# Patient Record
Sex: FEMALE | URBAN - NONMETROPOLITAN AREA
[De-identification: names, ages, dates, MRNs, and addresses within clinical notes are randomized per-mention and may not be internally consistent; named-entity substitution may affect disease eponyms.]

---

## 2023-10-26 ENCOUNTER — NURSE ONLY (OUTPATIENT)
Age: 68
End: 2023-10-26

## 2023-10-26 LAB
ALBUMIN SERPL BCG-MCNC: 4.1 G/DL (ref 3.5–5.1)
ALP SERPL-CCNC: 83 U/L (ref 38–126)
ALT SERPL W/O P-5'-P-CCNC: 25 U/L (ref 11–66)
ANION GAP SERPL CALC-SCNC: 13 MEQ/L (ref 8–16)
AST SERPL-CCNC: 22 U/L (ref 5–40)
BASOPHILS ABSOLUTE: 0.1 THOU/MM3 (ref 0–0.1)
BASOPHILS NFR BLD AUTO: 1.3 %
BILIRUB SERPL-MCNC: 0.8 MG/DL (ref 0.3–1.2)
BUN SERPL-MCNC: 18 MG/DL (ref 7–22)
CALCIUM SERPL-MCNC: 9.2 MG/DL (ref 8.5–10.5)
CHLORIDE SERPL-SCNC: 103 MEQ/L (ref 98–111)
CHOLEST SERPL-MCNC: 137 MG/DL (ref 100–199)
CO2 SERPL-SCNC: 25 MEQ/L (ref 23–33)
CREAT SERPL-MCNC: 0.7 MG/DL (ref 0.4–1.2)
CREAT UR-MCNC: 157.6 MG/DL
DEPRECATED MEAN GLUCOSE BLD GHB EST-ACNC: 135 MG/DL (ref 70–126)
DEPRECATED RDW RBC AUTO: 41.3 FL (ref 35–45)
EOSINOPHIL NFR BLD AUTO: 4.3 %
EOSINOPHILS ABSOLUTE: 0.3 THOU/MM3 (ref 0–0.4)
ERYTHROCYTE [DISTWIDTH] IN BLOOD BY AUTOMATED COUNT: 13.2 % (ref 11.5–14.5)
GFR SERPL CREATININE-BSD FRML MDRD: > 60 ML/MIN/1.73M2
GLUCOSE SERPL-MCNC: 130 MG/DL (ref 70–108)
HBA1C MFR BLD HPLC: 6.5 % (ref 4.4–6.4)
HCT VFR BLD AUTO: 40.9 % (ref 37–47)
HDLC SERPL-MCNC: 41 MG/DL
HGB BLD-MCNC: 13.1 GM/DL (ref 12–16)
IMM GRANULOCYTES # BLD AUTO: 0.02 THOU/MM3 (ref 0–0.07)
IMM GRANULOCYTES NFR BLD AUTO: 0.3 %
LDLC SERPL CALC-MCNC: 67 MG/DL
LYMPHOCYTES ABSOLUTE: 2.4 THOU/MM3 (ref 1–4.8)
LYMPHOCYTES NFR BLD AUTO: 30.5 %
MCH RBC QN AUTO: 27.5 PG (ref 26–33)
MCHC RBC AUTO-ENTMCNC: 32 GM/DL (ref 32.2–35.5)
MCV RBC AUTO: 85.7 FL (ref 81–99)
MICROALBUMIN UR-MCNC: 2.37 MG/DL
MICROALBUMIN/CREAT RATIO PNL UR: 15 MG/G (ref 0–30)
MONOCYTES ABSOLUTE: 0.5 THOU/MM3 (ref 0.4–1.3)
MONOCYTES NFR BLD AUTO: 6.3 %
NEUTROPHILS NFR BLD AUTO: 57.3 %
NRBC BLD AUTO-RTO: 0 /100 WBC
PLATELET # BLD AUTO: 284 THOU/MM3 (ref 130–400)
PMV BLD AUTO: 9.8 FL (ref 9.4–12.4)
POTASSIUM SERPL-SCNC: 4 MEQ/L (ref 3.5–5.2)
PROT SERPL-MCNC: 7.3 G/DL (ref 6.1–8)
RBC # BLD AUTO: 4.77 MILL/MM3 (ref 4.2–5.4)
SEGMENTED NEUTROPHILS ABSOLUTE COUNT: 4.5 THOU/MM3 (ref 1.8–7.7)
SODIUM SERPL-SCNC: 141 MEQ/L (ref 135–145)
TRIGL SERPL-MCNC: 145 MG/DL (ref 0–199)
TSH SERPL DL<=0.005 MIU/L-ACNC: 4.53 UIU/ML (ref 0.4–4.2)
WBC # BLD AUTO: 7.8 THOU/MM3 (ref 4.8–10.8)

## 2024-01-02 ENCOUNTER — HOSPITAL ENCOUNTER (OUTPATIENT)
Dept: WOMENS IMAGING | Age: 69
Discharge: HOME | End: 2024-01-02
Attending: RADIOLOGY

## 2024-01-02 DIAGNOSIS — Z00.6 ENCOUNTER FOR EXAMINATION FOR NORMAL COMPARISON OR CONTROL IN CLINICAL RESEARCH PROGRAM: ICD-10-CM

## 2024-01-02 PROBLEM — N84.1 ENDOCERVICAL POLYP: Status: ACTIVE | Noted: 2022-05-24

## 2024-01-02 PROBLEM — N95.0 POSTMENOPAUSAL BLEEDING: Status: ACTIVE | Noted: 2022-05-24

## 2024-01-02 PROBLEM — E66.9 OBESITY (BMI 30.0-34.9): Status: ACTIVE | Noted: 2022-05-17

## 2024-01-02 PROBLEM — E66.811 OBESITY (BMI 30.0-34.9): Status: ACTIVE | Noted: 2022-05-17

## 2024-01-02 RX ORDER — FOLIC ACID/MULTIVIT,IRON,MINER 0.4MG-18MG
1 TABLET ORAL DAILY
COMMUNITY

## 2024-01-02 RX ORDER — GLUCOSAMINE SULFATE 500 MG
2000 CAPSULE ORAL DAILY
COMMUNITY

## 2024-01-02 RX ORDER — MULTIVITAMIN
1 CAPSULE ORAL DAILY
COMMUNITY

## 2024-01-02 RX ORDER — ATORVASTATIN CALCIUM 10 MG/1
10 TABLET, FILM COATED ORAL DAILY
COMMUNITY

## 2024-01-02 RX ORDER — ENALAPRIL MALEATE 10 MG/1
10 TABLET ORAL DAILY
COMMUNITY

## 2024-01-02 RX ORDER — CHLORTHALIDONE 25 MG/1
25 TABLET ORAL DAILY
COMMUNITY

## 2024-01-02 RX ORDER — CETIRIZINE HYDROCHLORIDE 10 MG/1
10 TABLET ORAL DAILY
COMMUNITY

## 2024-01-04 ENCOUNTER — HOSPITAL ENCOUNTER (OUTPATIENT)
Age: 69
Discharge: HOME | End: 2024-01-04
Payer: MEDICARE

## 2024-01-04 ENCOUNTER — TELEPHONE (OUTPATIENT)
Dept: CASE MANAGEMENT | Age: 69
End: 2024-01-04

## 2024-01-04 ENCOUNTER — OFFICE VISIT (OUTPATIENT)
Dept: SURGERY | Age: 69
End: 2024-01-04
Payer: MEDICARE

## 2024-01-04 VITALS
HEART RATE: 77 BPM | TEMPERATURE: 97.2 F | HEIGHT: 70 IN | RESPIRATION RATE: 18 BRPM | DIASTOLIC BLOOD PRESSURE: 78 MMHG | SYSTOLIC BLOOD PRESSURE: 132 MMHG | OXYGEN SATURATION: 96 % | BODY MASS INDEX: 34.07 KG/M2 | WEIGHT: 238 LBS

## 2024-01-04 DIAGNOSIS — Z01.818 PRE-OP TESTING: ICD-10-CM

## 2024-01-04 DIAGNOSIS — C50.412 CARCINOMA OF UPPER-OUTER QUADRANT OF LEFT BREAST IN FEMALE, ESTROGEN RECEPTOR POSITIVE (HCC): ICD-10-CM

## 2024-01-04 DIAGNOSIS — C50.412 CARCINOMA OF UPPER-OUTER QUADRANT OF LEFT BREAST IN FEMALE, ESTROGEN RECEPTOR POSITIVE (HCC): Primary | ICD-10-CM

## 2024-01-04 DIAGNOSIS — E66.9 OBESITY (BMI 30.0-34.9): ICD-10-CM

## 2024-01-04 DIAGNOSIS — Z17.0 CARCINOMA OF UPPER-OUTER QUADRANT OF LEFT BREAST IN FEMALE, ESTROGEN RECEPTOR POSITIVE (HCC): ICD-10-CM

## 2024-01-04 DIAGNOSIS — Z17.0 CARCINOMA OF UPPER-OUTER QUADRANT OF LEFT BREAST IN FEMALE, ESTROGEN RECEPTOR POSITIVE (HCC): Primary | ICD-10-CM

## 2024-01-04 DIAGNOSIS — D05.12 DUCTAL CARCINOMA IN SITU (DCIS) OF LEFT BREAST: ICD-10-CM

## 2024-01-04 PROCEDURE — G8400 PT W/DXA NO RESULTS DOC: HCPCS | Performed by: SURGERY

## 2024-01-04 PROCEDURE — 99204 OFFICE O/P NEW MOD 45 MIN: CPT | Performed by: SURGERY

## 2024-01-04 PROCEDURE — 1090F PRES/ABSN URINE INCON ASSESS: CPT | Performed by: SURGERY

## 2024-01-04 PROCEDURE — 1123F ACP DISCUSS/DSCN MKR DOCD: CPT | Performed by: SURGERY

## 2024-01-04 PROCEDURE — 3017F COLORECTAL CA SCREEN DOC REV: CPT | Performed by: SURGERY

## 2024-01-04 PROCEDURE — G8427 DOCREV CUR MEDS BY ELIG CLIN: HCPCS | Performed by: SURGERY

## 2024-01-04 PROCEDURE — G8417 CALC BMI ABV UP PARAM F/U: HCPCS | Performed by: SURGERY

## 2024-01-04 PROCEDURE — 1036F TOBACCO NON-USER: CPT | Performed by: SURGERY

## 2024-01-04 PROCEDURE — G8484 FLU IMMUNIZE NO ADMIN: HCPCS | Performed by: SURGERY

## 2024-01-04 PROCEDURE — 93005 ELECTROCARDIOGRAM TRACING: CPT

## 2024-01-04 PROCEDURE — G9899 SCRN MAM PERF RSLTS DOC: HCPCS | Performed by: SURGERY

## 2024-01-04 NOTE — TELEPHONE ENCOUNTER
Name: Lizett Avery  : 1955  MRN: [id number]    Oncology Navigation Follow-Up Note    Contact Type:  Telephone    Notes: Notified from surgeon's office patient interested in genetic testing . Already scheduled for lumpectomy 1/10 with Dr. Chaves. Called to assess if meets criteria. States \"only cancer in family-mother with breast cancer, age 80.\" Does not meet criteria at this time. Verbalizes understanding. Encouraged her to call if finds more family history. Offered to try to get testing through insurance but states just hold off for now. Update Winnie Ya at Dr. Chaves's office.     Electronically signed by Ana Rosa Johnson RN on 2024 at 3:04 PM

## 2024-01-04 NOTE — PROGRESS NOTES
Subjective:      Patient ID: Lizett Avery is a 68 y.o. female.    HPI    Review of Systems  Chief Complaint   Patient presents with    Surgical Consult     NP refer Dr. Servin-DCIS left breast        Objective:   Physical Exam  /78 (Site: Right Lower Arm, Position: Sitting, Cuff Size: Medium Adult)   Pulse 77   Temp 97.2 °F (36.2 °C) (Temporal)   Resp 18   Ht 1.778 m (5' 10\")   Wt 108 kg (238 lb)   SpO2 96%   BMI 34.15 kg/m²     Assessment:            Plan:              Boby Garnett RN  
Number of children: Not on file    Years of education: Not on file    Highest education level: Not on file   Occupational History    Not on file   Tobacco Use    Smoking status: Former     Current packs/day: 1.00     Types: Cigarettes    Smokeless tobacco: Never    Tobacco comments:     Stopped in 1997   Substance and Sexual Activity    Alcohol use: Not Currently    Drug use: Never    Sexual activity: Not on file   Other Topics Concern    Not on file   Social History Narrative    Not on file     Social Determinants of Health     Financial Resource Strain: Not on file   Food Insecurity: Not on file   Transportation Needs: Not on file   Physical Activity: Not on file   Stress: Not on file   Social Connections: Not on file   Intimate Partner Violence: Not on file   Housing Stability: Not on file           Review of Systems  Review of Systems   Constitutional:  Negative for chills, fatigue, fever and unexpected weight change.   HENT:  Negative for sore throat, trouble swallowing and voice change.    Eyes:  Negative for visual disturbance.   Respiratory:  Negative for cough, shortness of breath and wheezing.    Cardiovascular:  Negative for chest pain and palpitations.   Gastrointestinal:  Negative for abdominal pain, blood in stool, nausea and vomiting.   Endocrine: Negative for cold intolerance, heat intolerance and polydipsia.   Genitourinary:  Negative for dysuria, flank pain, hematuria and vaginal bleeding.   Musculoskeletal:  Negative for gait problem, joint swelling and myalgias.   Skin:  Negative for color change and rash.   Allergic/Immunologic: Negative for immunocompromised state.   Neurological:  Negative for dizziness, tremors, seizures and speech difficulty.   Hematological:  Negative for adenopathy. Does not bruise/bleed easily.   Psychiatric/Behavioral:  Negative for behavioral problems and confusion.        OBJECTIVE     /78 (Site: Right Lower Arm, Position: Sitting, Cuff Size: Medium Adult)

## 2024-01-05 LAB
EKG ATRIAL RATE: 72 BPM
EKG P AXIS: 19 DEGREES
EKG P-R INTERVAL: 150 MS
EKG Q-T INTERVAL: 402 MS
EKG QRS DURATION: 84 MS
EKG QTC CALCULATION (BAZETT): 440 MS
EKG R AXIS: 43 DEGREES
EKG T AXIS: 34 DEGREES
EKG VENTRICULAR RATE: 72 BPM

## 2024-01-08 ENCOUNTER — HOSPITAL ENCOUNTER (OUTPATIENT)
Dept: WOMENS IMAGING | Age: 69
Discharge: HOME | End: 2024-01-08
Attending: RADIOLOGY

## 2024-01-08 DIAGNOSIS — Z00.6 ENCOUNTER FOR EXAMINATION FOR NORMAL COMPARISON OR CONTROL IN CLINICAL RESEARCH PROGRAM: ICD-10-CM

## 2024-01-08 ASSESSMENT — ENCOUNTER SYMPTOMS
VOMITING: 0
BLOOD IN STOOL: 0
COUGH: 0
SHORTNESS OF BREATH: 0
COLOR CHANGE: 0
TROUBLE SWALLOWING: 0
WHEEZING: 0
ABDOMINAL PAIN: 0
NAUSEA: 0
SORE THROAT: 0
VOICE CHANGE: 0

## 2024-01-09 ENCOUNTER — HOSPITAL ENCOUNTER (OUTPATIENT)
Dept: PHYSICAL THERAPY | Age: 69
Setting detail: THERAPIES SERIES
Discharge: HOME | End: 2024-01-09
Payer: MEDICARE

## 2024-01-09 PROCEDURE — 97161 PT EVAL LOW COMPLEX 20 MIN: CPT

## 2024-01-09 PROCEDURE — 97110 THERAPEUTIC EXERCISES: CPT

## 2024-01-09 NOTE — PROGRESS NOTES
* PLEASE SIGN, DATE AND TIME CERTIFICATION BELOW AND RETURN TO [facility] OUTPATIENT REHABILITATION (FAX #: [phone]).  ATTEST/CO-SIGN IF ACCESSING VIA [employer].  THANK YOU.**    I certify that I have examined the patient below and determined that Physical Medicine and Rehabilitation service is necessary and that I approve the established plan of care for up to 90 days or as specifically noted.  Attestation, signature or co-signature of physician indicates approval of certification requirements.    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  AQUATIC THERAPY COVERED:   Yes  MODALITIES COVERED:  Yes, with the exception of iontophoresis and hot packs/cold packs  TELEHEALTH COVERED: Yes   Visit # 1, 1/10 for progress note   Visits Allowed: Unlimited visits based on medical necessity   Recertification Date: 24   Physician Follow-Up: L lumpectomy 1/10/24   Physician Orders: Prehab   History of Present Illness: Recently diagnosed with left breast cancer

## 2024-01-10 ENCOUNTER — HOSPITAL ENCOUNTER (OUTPATIENT)
Age: 69
Setting detail: OUTPATIENT SURGERY
Discharge: HOME | End: 2024-01-10
Attending: SURGERY | Admitting: SURGERY
Payer: MEDICARE

## 2024-01-10 ENCOUNTER — HOSPITAL ENCOUNTER (OUTPATIENT)
Dept: WOMENS IMAGING | Age: 69
Discharge: HOME | End: 2024-01-10
Attending: SURGERY
Payer: MEDICARE

## 2024-01-10 ENCOUNTER — HOSPITAL ENCOUNTER (OUTPATIENT)
Dept: WOMENS IMAGING | Age: 69
Discharge: HOME | End: 2024-01-10
Payer: MEDICARE

## 2024-01-10 ENCOUNTER — ANESTHESIA (OUTPATIENT)
Dept: OPERATING ROOM | Age: 69
End: 2024-01-10
Payer: MEDICARE

## 2024-01-10 ENCOUNTER — SOCIAL WORK (OUTPATIENT)
Dept: INFUSION THERAPY | Age: 69
End: 2024-01-10

## 2024-01-10 ENCOUNTER — ANESTHESIA EVENT (OUTPATIENT)
Dept: OPERATING ROOM | Age: 69
End: 2024-01-10
Payer: MEDICARE

## 2024-01-10 VITALS
SYSTOLIC BLOOD PRESSURE: 133 MMHG | WEIGHT: 240.2 LBS | HEART RATE: 67 BPM | DIASTOLIC BLOOD PRESSURE: 70 MMHG | OXYGEN SATURATION: 95 % | TEMPERATURE: 96.6 F | BODY MASS INDEX: 34.39 KG/M2 | RESPIRATION RATE: 16 BRPM | HEIGHT: 70 IN

## 2024-01-10 DIAGNOSIS — Z17.0 CARCINOMA OF UPPER-OUTER QUADRANT OF LEFT BREAST IN FEMALE, ESTROGEN RECEPTOR POSITIVE (HCC): ICD-10-CM

## 2024-01-10 DIAGNOSIS — C50.412 CARCINOMA OF UPPER-OUTER QUADRANT OF LEFT BREAST IN FEMALE, ESTROGEN RECEPTOR POSITIVE (HCC): ICD-10-CM

## 2024-01-10 DIAGNOSIS — D05.12 DUCTAL CARCINOMA IN SITU (DCIS) OF LEFT BREAST: ICD-10-CM

## 2024-01-10 LAB — GLUCOSE BLD STRIP.AUTO-MCNC: 117 MG/DL (ref 70–108)

## 2024-01-10 PROCEDURE — 7100000011 HC PHASE II RECOVERY - ADDTL 15 MIN: Performed by: SURGERY

## 2024-01-10 PROCEDURE — 3600000012 HC SURGERY LEVEL 2 ADDTL 15MIN: Performed by: SURGERY

## 2024-01-10 PROCEDURE — 3700000001 HC ADD 15 MINUTES (ANESTHESIA): Performed by: SURGERY

## 2024-01-10 PROCEDURE — 19301 PARTIAL MASTECTOMY: CPT | Performed by: SURGERY

## 2024-01-10 PROCEDURE — 2580000003 HC RX 258: Performed by: NURSE ANESTHETIST, CERTIFIED REGISTERED

## 2024-01-10 PROCEDURE — G0279 TOMOSYNTHESIS, MAMMO: HCPCS

## 2024-01-10 PROCEDURE — 6360000002 HC RX W HCPCS: Performed by: NURSE ANESTHETIST, CERTIFIED REGISTERED

## 2024-01-10 PROCEDURE — 3700000000 HC ANESTHESIA ATTENDED CARE: Performed by: SURGERY

## 2024-01-10 PROCEDURE — 2500000003 HC RX 250 WO HCPCS: Performed by: NURSE ANESTHETIST, CERTIFIED REGISTERED

## 2024-01-10 PROCEDURE — 2500000003 HC RX 250 WO HCPCS: Performed by: SURGERY

## 2024-01-10 PROCEDURE — 7100000010 HC PHASE II RECOVERY - FIRST 15 MIN: Performed by: SURGERY

## 2024-01-10 PROCEDURE — 6360000002 HC RX W HCPCS: Performed by: SURGERY

## 2024-01-10 PROCEDURE — 2709999900 HC NON-CHARGEABLE SUPPLY: Performed by: SURGERY

## 2024-01-10 PROCEDURE — 88307 TISSUE EXAM BY PATHOLOGIST: CPT

## 2024-01-10 PROCEDURE — 76098 X-RAY EXAM SURGICAL SPECIMEN: CPT

## 2024-01-10 PROCEDURE — 3600000002 HC SURGERY LEVEL 2 BASE: Performed by: SURGERY

## 2024-01-10 PROCEDURE — 2709999900 US PLACE BREAST LOC DEVICE 1ST LESION LEFT

## 2024-01-10 PROCEDURE — 82948 REAGENT STRIP/BLOOD GLUCOSE: CPT

## 2024-01-10 RX ORDER — TRAMADOL HYDROCHLORIDE 50 MG/1
50 TABLET ORAL EVERY 4 HOURS PRN
Qty: 18 TABLET | Refills: 0 | Status: SHIPPED | OUTPATIENT
Start: 2024-01-10 | End: 2024-01-13

## 2024-01-10 RX ORDER — MORPHINE SULFATE 2 MG/ML
4 INJECTION, SOLUTION INTRAMUSCULAR; INTRAVENOUS
Status: DISCONTINUED | OUTPATIENT
Start: 2024-01-10 | End: 2024-01-10 | Stop reason: HOSPADM

## 2024-01-10 RX ORDER — TRAMADOL HYDROCHLORIDE 50 MG/1
50 TABLET ORAL EVERY 6 HOURS PRN
Status: DISCONTINUED | OUTPATIENT
Start: 2024-01-10 | End: 2024-01-10 | Stop reason: HOSPADM

## 2024-01-10 RX ORDER — SODIUM CHLORIDE 9 MG/ML
INJECTION, SOLUTION INTRAVENOUS CONTINUOUS
Status: DISCONTINUED | OUTPATIENT
Start: 2024-01-10 | End: 2024-01-10 | Stop reason: HOSPADM

## 2024-01-10 RX ORDER — LIDOCAINE HYDROCHLORIDE AND EPINEPHRINE 10; 10 MG/ML; UG/ML
INJECTION, SOLUTION INFILTRATION; PERINEURAL PRN
Status: DISCONTINUED | OUTPATIENT
Start: 2024-01-10 | End: 2024-01-10 | Stop reason: ALTCHOICE

## 2024-01-10 RX ORDER — BUPIVACAINE HYDROCHLORIDE 5 MG/ML
INJECTION, SOLUTION PERINEURAL PRN
Status: DISCONTINUED | OUTPATIENT
Start: 2024-01-10 | End: 2024-01-10 | Stop reason: ALTCHOICE

## 2024-01-10 RX ORDER — PROPOFOL 10 MG/ML
INJECTION, EMULSION INTRAVENOUS PRN
Status: DISCONTINUED | OUTPATIENT
Start: 2024-01-10 | End: 2024-01-10 | Stop reason: SDUPTHER

## 2024-01-10 RX ORDER — MORPHINE SULFATE 2 MG/ML
2 INJECTION, SOLUTION INTRAMUSCULAR; INTRAVENOUS
Status: DISCONTINUED | OUTPATIENT
Start: 2024-01-10 | End: 2024-01-10 | Stop reason: HOSPADM

## 2024-01-10 RX ORDER — MIDAZOLAM HYDROCHLORIDE 1 MG/ML
INJECTION INTRAMUSCULAR; INTRAVENOUS PRN
Status: DISCONTINUED | OUTPATIENT
Start: 2024-01-10 | End: 2024-01-10 | Stop reason: SDUPTHER

## 2024-01-10 RX ORDER — ACETAMINOPHEN 325 MG/1
650 TABLET ORAL EVERY 4 HOURS PRN
Status: DISCONTINUED | OUTPATIENT
Start: 2024-01-10 | End: 2024-01-10 | Stop reason: HOSPADM

## 2024-01-10 RX ORDER — ONDANSETRON 2 MG/ML
4 INJECTION INTRAMUSCULAR; INTRAVENOUS EVERY 6 HOURS PRN
Status: DISCONTINUED | OUTPATIENT
Start: 2024-01-10 | End: 2024-01-10 | Stop reason: HOSPADM

## 2024-01-10 RX ORDER — ONDANSETRON 4 MG/1
4 TABLET, ORALLY DISINTEGRATING ORAL EVERY 8 HOURS PRN
Status: DISCONTINUED | OUTPATIENT
Start: 2024-01-10 | End: 2024-01-10 | Stop reason: HOSPADM

## 2024-01-10 RX ORDER — SODIUM CHLORIDE 0.9 % (FLUSH) 0.9 %
5-40 SYRINGE (ML) INJECTION EVERY 12 HOURS SCHEDULED
Status: DISCONTINUED | OUTPATIENT
Start: 2024-01-10 | End: 2024-01-10 | Stop reason: HOSPADM

## 2024-01-10 RX ORDER — SODIUM CHLORIDE 9 MG/ML
INJECTION, SOLUTION INTRAVENOUS PRN
Status: DISCONTINUED | OUTPATIENT
Start: 2024-01-10 | End: 2024-01-10 | Stop reason: HOSPADM

## 2024-01-10 RX ORDER — TRAMADOL HYDROCHLORIDE 50 MG/1
100 TABLET ORAL EVERY 6 HOURS PRN
Status: DISCONTINUED | OUTPATIENT
Start: 2024-01-10 | End: 2024-01-10 | Stop reason: HOSPADM

## 2024-01-10 RX ORDER — SODIUM CHLORIDE 0.9 % (FLUSH) 0.9 %
5-40 SYRINGE (ML) INJECTION PRN
Status: DISCONTINUED | OUTPATIENT
Start: 2024-01-10 | End: 2024-01-10 | Stop reason: HOSPADM

## 2024-01-10 RX ORDER — LIDOCAINE HYDROCHLORIDE 20 MG/ML
INJECTION, SOLUTION INFILTRATION; PERINEURAL PRN
Status: DISCONTINUED | OUTPATIENT
Start: 2024-01-10 | End: 2024-01-10 | Stop reason: SDUPTHER

## 2024-01-10 RX ORDER — SODIUM CHLORIDE 9 MG/ML
INJECTION, SOLUTION INTRAVENOUS CONTINUOUS PRN
Status: DISCONTINUED | OUTPATIENT
Start: 2024-01-10 | End: 2024-01-10 | Stop reason: SDUPTHER

## 2024-01-10 RX ADMIN — LIDOCAINE HYDROCHLORIDE 60 MG: 20 INJECTION, SOLUTION INFILTRATION; PERINEURAL at 13:42

## 2024-01-10 RX ADMIN — Medication 2000 MG: at 13:41

## 2024-01-10 RX ADMIN — SODIUM CHLORIDE: 9 INJECTION, SOLUTION INTRAVENOUS at 13:40

## 2024-01-10 RX ADMIN — MIDAZOLAM 2 MG: 1 INJECTION INTRAMUSCULAR; INTRAVENOUS at 13:41

## 2024-01-10 RX ADMIN — PROPOFOL 50 MG: 10 INJECTION, EMULSION INTRAVENOUS at 13:42

## 2024-01-10 RX ADMIN — PROPOFOL 100 MCG/KG/MIN: 10 INJECTION, EMULSION INTRAVENOUS at 13:43

## 2024-01-10 ASSESSMENT — PAIN - FUNCTIONAL ASSESSMENT: PAIN_FUNCTIONAL_ASSESSMENT: 0-10

## 2024-01-10 NOTE — ANESTHESIA PRE PROCEDURE
BP Readings from Last 3 Encounters:   01/10/24 (!) 144/78   01/04/24 132/78       NPO Status: Time of last liquid consumption: 0730 (sip of water with pills)                        Time of last solid consumption: 1900                        Date of last liquid consumption: 01/10/24                        Date of last solid food consumption: 01/09/24    BMI:   Wt Readings from Last 3 Encounters:   01/10/24 109 kg (240 lb 3.2 oz)   01/04/24 108 kg (238 lb)     Body mass index is 34.47 kg/m².    CBC:   Lab Results   Component Value Date/Time    WBC 7.8 10/26/2023 07:46 AM    RBC 4.77 10/26/2023 07:46 AM    HGB 13.1 10/26/2023 07:46 AM    HCT 40.9 10/26/2023 07:46 AM    MCV 85.7 10/26/2023 07:46 AM     10/26/2023 07:46 AM       CMP:   Lab Results   Component Value Date/Time     10/26/2023 07:46 AM    K 4.0 10/26/2023 07:46 AM     10/26/2023 07:46 AM    CO2 25 10/26/2023 07:46 AM    BUN 18 10/26/2023 07:46 AM    CREATININE 0.7 10/26/2023 07:46 AM    LABGLOM >60 10/26/2023 07:46 AM    GLUCOSE 130 10/26/2023 07:46 AM    GLUCOSE 138 04/25/2022 07:52 AM    PROT 7.3 10/26/2023 07:46 AM    CALCIUM 9.2 10/26/2023 07:46 AM    BILITOT 0.8 10/26/2023 07:46 AM    ALKPHOS 83 10/26/2023 07:46 AM    AST 22 10/26/2023 07:46 AM    ALT 25 10/26/2023 07:46 AM       POC Tests:   Recent Labs     01/10/24  1212   POCGLU 117*       Coags: No results found for: \"PROTIME\", \"INR\", \"APTT\"    HCG (If Applicable): No results found for: \"PREGTESTUR\", \"PREGSERUM\", \"HCG\", \"HCGQUANT\"     ABGs: No results found for: \"PHART\", \"PO2ART\", \"DSY7MAQ\", \"NQZ5DPO\", \"BEART\", \"W7DVGSLJ\"     Type & Screen (If Applicable):  No results found for: \"LABABO\", \"LABRH\"    Drug/Infectious Status (If Applicable):  No results found for: \"HIV\", \"HEPCAB\"    COVID-19 Screening (If Applicable): No results found for: \"COVID19\"        Anesthesia Evaluation  Patient summary reviewed and Nursing notes reviewed   no history of anesthetic complications:

## 2024-01-10 NOTE — PROGRESS NOTES
Contact Type:  [facility]    Contact Information: Arrived with daughter for needle localization. Having breast surgery today with Dr. Chaves.    Diagnosis: DCIS    Patient Expresses Need(s) For: n/a     Teaching Sheets/Booklets Provided: n/a    Notes: Procedure explained and questions addressed as needed.  Dr. Dobbs placed wire. Secured with gauze and tape per protocol. Gift given. Keily transported patient with belongings to Surgery Center via wheelchair at 1102.

## 2024-01-10 NOTE — PROGRESS NOTES
Oncology Social Work    Date: 1/10/2024  Time: 2:28 PM  Name: Lizett Avery  MRN: [id number]     Contact Type: Follow-up    Note:   Situation: This staff called Lizett Avery via phone support to introduce myself as her Oncology Social Worker.     Background:  Lizett had her recent appointment with the General Surgeon's Office. This staff was calling to review if she had any outstanding concerns identified on her coping thermometer.     Assessment:  The contact number provided to this staff and Medical Records is her number and it was identified as such in the voicemail recording. Since the call went immediately to a voicemail, a message was left regarding the purpose of the call.   - Education regarding the services was provided on the recorded message from the SW.  - No community referrals were placed at this time because this staff has no indication of where Lizett is in her treatment plan. Referral services may be reconsidered should she express needs regarding assistance.    Recommendation: Follow-up will be initiated by Lizett based on need should she choose to return a call to the identified number.  was able to provide her with my contact information and will remain available for support.      RAUL Haas, LISSETH, DAMIEN  Oncology Social Worker      Electronically signed by RAUL Haas LSW, ACHP-SW on 1/10/2024 at 2:28 PM

## 2024-01-10 NOTE — DISCHARGE INSTRUCTIONS
DR HAIRSTON'S DISCHARGE INSTRUCTIONS    Pt Name: Lizett Avery  Medical Record Number: [id number]  Today's Date: 1/10/2024    GENERAL ANESTHESIA OR SEDATION  1. Do not drive or operate hazardous machinery for 24 hours.  2. Do not make important business or personal decisions for 24 hours.  3. Do not drink alcoholic beverages or use tobacco for 24 hours.    ACTIVITY INSTRUCTIONS:  [] Rest today. Resume light to normal activity tomorrow.   [x] You may resume normal activity tomorrow. Do not engage in strenuous activity that may place stress on your incision.  [] Do not drive for 3-5 days and avoid heavy lifting, tugging, pullings greater than 10-20 lbs until seen in the office.      DIET INSTRUCTIONS:  []Begin with clear liquids. If not nauseated, may increase to a low-fat diet when you desire. Greasy and spicy foods are not advised.  [x]Regular diet as tolerated.  []Other:     MEDICATIONS  [x]Prescription sent with you to be used as directed.   []Lortab   [x]Tramadol   []Percocet   []Tylenol #3   []Oxycontin   Do not drink alcohol or drive while taking these medications. You may experience dizziness or drowsiness with these medications. You may also experience constipation which can be relieved with stool softners or laxatives.  [x]You may resume your daily prescription medication schedule unless otherwise specified.  [x]Do not take 325mg Aspirin or other blood thinners such as Coumadin or Plavix for 5 days.     WOUND/DRESSING INSTRUCTIONS:  Always ensure you and your care giver clean hands before and after caring for the wound.  [] Keep dressing clean and dry for 48 hours. Change when soiled or wet.      [] Allow steri-strips to fall off on their own.   [] Ice operative site for 20 minutes 4 times a day.     [x] May wash over incision in shower in 24 hours, but do not soak in a bath.  [] Take sitz bath for 20 minutes twice daily and after bowel movements.  [] Keep the abdominal binder in place during the day. May

## 2024-01-10 NOTE — ANESTHESIA POSTPROCEDURE EVALUATION
Department of Anesthesiology  Postprocedure Note    Patient: Lizett Avery  MRN: [id number]  YOB: 1955  Date of evaluation: 1/10/2024    Procedure Summary       Date: 01/10/24 Room / Location: [address]    Anesthesia Start: 1340 Anesthesia Stop: 1425    Procedure: Left Breast Lumpectomy Pre op Needle Loc (Left) Diagnosis:       Ductal carcinoma in situ (DCIS) of left breast      (Ductal carcinoma in situ (DCIS) of left breast [D05.12])    Surgeons: Allie Chaves MD Responsible Provider: Trey Dejesus DO    Anesthesia Type: MAC ASA Status: 3            Anesthesia Type: MAC    Ed Phase I:      Ed Phase II: Ed Score: 10    Anesthesia Post Evaluation    Patient location during evaluation: PACU  Patient participation: complete - patient participated  Level of consciousness: awake and alert  Pain score: 0  Airway patency: patent  Nausea & Vomiting: no nausea and no vomiting  Cardiovascular status: hemodynamically stable and blood pressure returned to baseline  Respiratory status: spontaneous ventilation, room air and acceptable  Hydration status: stable  Pain management: adequate and satisfactory to patient    No notable events documented.

## 2024-01-10 NOTE — H&P
[facility]  History and Physical Update    Pt Name: Lizett Avery  MRN: [id number]  YOB: 1955  Date of evaluation: 1/10/2024    [x] I have examined the patient and reviewed the H&P/Consult and there are no changes to the patient or plans.    [] I have examined the patient and reviewed the H&P/Consult and have noted the following changes:        Allie Chaves MD MD  Electronically signed 1/10/2024 at 12:32 PM  Allie Chaves MD   General Surgery  New Patient Evaluation in Office  Pt Name: Lizett Avery  Date of Birth 1955   Today's Date: 1/4/2024  Medical Record Number: [id number]  Referring Provider: Bo Servin MD  Primary Care Provider: Bo Servin MD  Chief Complaint:       Chief Complaint   Patient presents with    Surgical Consult       NP refer Dr. Servin-DCIS left breast          ASSESSMENT   1. Carcinoma of upper-outer quadrant of left breast in female, estrogen receptor positive (HCC)    2. Pre-op testing    3. Ductal carcinoma in situ (DCIS) of left breast    4. Obesity (BMI 30.0-34.9)       PLANS   1.  Outside imaging and pathology reviewed.  Pathology performed one of our in-house pathologist.  2.  Discussed surgical treatment of stage 0 breast cancer.  Breast conservation therapy with or without radiation at age 68 she may be a candidate to withhold radiation therapy as long as patient willing to take 5 years of antiestrogen therapy.  Alternative mastectomy with and without reconstruction.  Patient is opting proceed with breast conservation therapy.  3.  Postoperative medical and radiation oncology consultation.  4.  Schedule patient for left partial mastectomy with preoperative wire localization  5.  General anesthesia  6.  Preoperative testing per anesthesia guidelines  7.  By strict described.  Patient not candidate for genetic testing 143 relative with history of breast cancer.  She consider whether she wants to pay out-of-pocket.      SUBJECTIVE               [id number]    AGE: 68 Y                      : 1955                         PATHOLOGY REPORT                      ATTN:                      REQ: JASBIR HAIRSTON          Clinical Information: ABNORMAL MAMMOGRAM    FINAL DIAGNOSIS:  Outside slides [id number]; 2023    Left breast, calcs, biopsy:   Ductal carcinoma in situ, nuclear grade 2, cribriform and solid types  with central  necrosis   Microcalcifications present in DCIS and in nonneoplastic tissue    Breast biomarkers performed at outside institution  Estrogen Receptor: (Clone SP1), [employer]      Positive 100% of cells, strong intensity    Progesterone Receptor: (Clone 1E2), [employer]      Positive 90% of cells, moderate-strong intensity    External Controls:  Adequate  Internal Controls:  Adequate  Standard Assay Conditions:  Met per outside pathology report    Specimen:  OUTSIDE SLIDE REVIEW, LEFT BREAST      Gross Examination:  Received from the outside institution are 5 H&E stained slides, 3  immunohistochemical stains and surgical pathology report labeled  Lizett Avery [id number]; 2023      Microscopic Examination:  Microscopic examination performed.    [id number]                                                      <Sign Out Dr. Adams>                                             SHANIQUA DOWNS M.D., F.C.A.P      [facility]   Printed on:  1/3/2024  [address] [place] [id number]  Original print date:  2024      Specimen Collected: 24 13:19 EST Last Resulted: 24 10:24 EST

## 2024-01-10 NOTE — PROGRESS NOTES
1425: Patient arrived to Phase II recovery via chair. Awake and alert. Report received from DARIUS Burrell.  1427: VSS. Patient denies pain. Incision to L breast clean dry and intact with surgical glue. Snack and drink provided. Family present in room. Call light in reach.  1455: Discharge instructions reviewed with patient and patient's family. AVS provided for discharge. IV removed. Patient dressed independently.  1500: Patient escorted to vehicle and discharged home in stable condition with family.

## 2024-01-10 NOTE — OP NOTE
[facility]  Operative Report    PATIENT NAME: Lizett Avery  MEDICAL RECORD NO. [id number]  SURGEON: Allie Chaves MD  Primary Care Physician: Bo Servin MD  Date: 1/10/2024, 2:23 PM     PROCEDURE PERFORMED: Left partial mastectomy with preoperative wire localization  PREOPERATIVE DIAGNOSIS:   Active Hospital Problems    Diagnosis Date Noted    Ductal carcinoma in situ (DCIS) of left breast [D05.12] 01/10/2024   Clinical stage 0  POSTOPERATIVE DIAGNOSIS: Same, path pending  SURGEON:  Allie Chaves MD   ANESTHESIA:  Monitored Local Anesthesia with Sedation and local  ANESTHESIA:  22 OF 0.5% Marcaine and 1% xylocaine in equal parts  ESTIMATED BLOOD LOSS:  10  ml  SPECIMEN: To pathology with orienting markers.  Straight end of the wire marks the lateral aspect  COMPLICATIONS:  None; patient tolerated the procedure well.  DRAINS: none  DISPOSITION: Recovery Room  CONDITION: stable      Narrative:    Patient diagnosed with ductal carcinoma in situ left breast upper outer quadrant central breast.  Patient opted for breast conservation therapy.    Procedure: Patient was brought to the operating suite after wire localization procedure was performed [facility].  She was placed supine on the operating table and mask was compression devices on the lower extremities.  She was given Ancef and sedation and monitoring per the anesthesia department.  After she was appropriately sedated the left breast and wire were prepped and draped in the usual sterile fashion.  Timeout was performed.    After infiltrating with local anesthetic I made a skin incision upper outer quadrant left breast the wire was delivered and the wound deep dissection was carried down sharply to deeper tissue.  Lumpectomy with wide margins was performed about the specimen.  There seem to be some scarring at the medial aspect so additional medial margin was taken and marked as new medial margin as a separate specimen.   Specimen radiograph revealed clip wire and any residual calcifications within the specimen.  Hemostasis was achieved electrocautery.  Some marking clips were placed to jenna the lumpectomy cavity.  Dermis was closed with interrupted 3-0 Vicryl suture and skin was closed with running subcuticular 4-0 Monocryl suture.  Skin glue was applied.  Sponge sharp instrument counts were correct.  Patient was transported to the recovery area and surgery center in stable condition.

## 2024-01-11 ENCOUNTER — TELEPHONE (OUTPATIENT)
Dept: SURGERY | Age: 69
End: 2024-01-11

## 2024-01-11 NOTE — TELEPHONE ENCOUNTER
Pt called stating she received a call about her surgery.   Called pt for S/P  Left partial mastectomy with preoperative wire localization --1/10/24.  Pt stated no concerns at this time. Advised if taking any pain meds to add a stool softener with it. Pt notified of f/u appt time and date.  Advised to call if any questions or concerns.

## 2024-01-16 DIAGNOSIS — C50.412 CARCINOMA OF UPPER-OUTER QUADRANT OF LEFT BREAST IN FEMALE, ESTROGEN RECEPTOR POSITIVE (HCC): ICD-10-CM

## 2024-01-16 DIAGNOSIS — Z17.0 CARCINOMA OF UPPER-OUTER QUADRANT OF LEFT BREAST IN FEMALE, ESTROGEN RECEPTOR POSITIVE (HCC): ICD-10-CM

## 2024-01-18 ENCOUNTER — OFFICE VISIT (OUTPATIENT)
Dept: SURGERY | Age: 69
End: 2024-01-18

## 2024-01-18 VITALS
SYSTOLIC BLOOD PRESSURE: 122 MMHG | BODY MASS INDEX: 34.35 KG/M2 | HEIGHT: 70 IN | RESPIRATION RATE: 16 BRPM | DIASTOLIC BLOOD PRESSURE: 60 MMHG | WEIGHT: 239.9 LBS | OXYGEN SATURATION: 98 % | TEMPERATURE: 97.5 F | HEART RATE: 75 BPM

## 2024-01-18 DIAGNOSIS — Z17.0 CARCINOMA OF UPPER-OUTER QUADRANT OF LEFT BREAST IN FEMALE, ESTROGEN RECEPTOR POSITIVE (HCC): Primary | ICD-10-CM

## 2024-01-18 DIAGNOSIS — Z09 POSTOP CHECK: ICD-10-CM

## 2024-01-18 DIAGNOSIS — C50.412 CARCINOMA OF UPPER-OUTER QUADRANT OF LEFT BREAST IN FEMALE, ESTROGEN RECEPTOR POSITIVE (HCC): Primary | ICD-10-CM

## 2024-01-18 NOTE — PROGRESS NOTES
Allie Chaves MD  General Surgery  Postprocedure Evaluation in Office  Pt Name: Lizett Avery  Date of Birth 1955   Today's Date: 1/18/2024  Medical Record Number: [id number]  Primary Care Provider: Bo Servin MD  Chief Complaint   Patient presents with    Post-Op Check     S/P-- Left partial mastectomy with preoperative wire localization--1/10/24     ASSESSMENT      1. Carcinoma of upper-outer quadrant of left breast in female, estrogen receptor positive (HCC)    2. Postop check    3.  DCIS completely excised margins negative     PLANS       Pathology reviewed with the patient who understands. All questions were answered.  Follow up: With me in 3 months  3.  Referral to medical and radiation oncology for consultation.  Patient may be a good candidate to withhold radiation therapy.      AKILA Phoenix is seen today for post-op follow-up. She is status post left partial mastectomy for ductal carcinoma in situ hormone responsive.  Pathology reviewed with patient.  Residual 2 mm volume of disease.  Closest margin 6 mm.  Medications    Current Outpatient Medications:     atorvastatin (LIPITOR) 10 MG tablet, Take 1 tablet by mouth daily, Disp: , Rfl:     cetirizine (ZYRTEC) 10 MG tablet, Take 1 tablet by mouth daily, Disp: , Rfl:     chlorthalidone (HYGROTON) 25 MG tablet, Take 1 tablet by mouth daily, Disp: , Rfl:     enalapril (VASOTEC) 10 MG tablet, Take 1 tablet by mouth daily, Disp: , Rfl:     Multiple Vitamin (MULTIVITAMIN) capsule, Take 1 capsule by mouth daily, Disp: , Rfl:     Krill Oil 350 MG CAPS, Take 1 capsule by mouth daily, Disp: , Rfl:     Glucosamine 500 MG CAPS, Take 2,000 mg by mouth daily, Disp: , Rfl:     CALCIUM-VITAMIN D PO, Take 2 tablets by mouth daily, Disp: , Rfl:   Allergies    Allergies   Allergen Reactions    Penicillins      Itchy ears and throat.       Review of Systems  History obtained from the patient.    Constitutional: Denies any fever, chills, fatigue.  Wound:

## 2024-01-23 ENCOUNTER — TELEPHONE (OUTPATIENT)
Dept: SURGERY | Age: 69
End: 2024-01-23

## 2024-01-23 PROBLEM — C50.912 BREAST CANCER, LEFT (HCC): Status: ACTIVE | Noted: 2024-01-23

## 2024-01-23 NOTE — TELEPHONE ENCOUNTER
Pt called stating she has a small lump at the incision site, where the healing is asking if this could be from healing.  Asked if it was in the line of her incision and she stated yes. I suggested it could be. She stated it is not painful to touch.  Advised she has appt tmw with Rad Onc, if she still feels unsure maybe ask them if they feel it should be looked at, because the way she was describing the bump, it sounds like it would be from the healing.

## 2024-01-24 ENCOUNTER — INITIAL CONSULT (OUTPATIENT)
Dept: RADIATION ONCOLOGY | Age: 69
End: 2024-01-24
Payer: MEDICARE

## 2024-01-24 ENCOUNTER — SOCIAL WORK (OUTPATIENT)
Dept: INFUSION THERAPY | Age: 69
End: 2024-01-24

## 2024-01-24 VITALS
TEMPERATURE: 97.2 F | HEART RATE: 86 BPM | SYSTOLIC BLOOD PRESSURE: 149 MMHG | DIASTOLIC BLOOD PRESSURE: 75 MMHG | WEIGHT: 240.3 LBS | BODY MASS INDEX: 34.4 KG/M2 | HEIGHT: 70 IN | RESPIRATION RATE: 20 BRPM | OXYGEN SATURATION: 96 %

## 2024-01-24 DIAGNOSIS — D05.12 DUCTAL CARCINOMA IN SITU (DCIS) OF LEFT BREAST: Primary | ICD-10-CM

## 2024-01-24 PROCEDURE — 99205 OFFICE O/P NEW HI 60 MIN: CPT

## 2024-01-24 PROCEDURE — 1090F PRES/ABSN URINE INCON ASSESS: CPT

## 2024-01-24 PROCEDURE — G8417 CALC BMI ABV UP PARAM F/U: HCPCS

## 2024-01-24 PROCEDURE — G8400 PT W/DXA NO RESULTS DOC: HCPCS

## 2024-01-24 PROCEDURE — 1123F ACP DISCUSS/DSCN MKR DOCD: CPT

## 2024-01-24 PROCEDURE — G8484 FLU IMMUNIZE NO ADMIN: HCPCS

## 2024-01-24 PROCEDURE — 3017F COLORECTAL CA SCREEN DOC REV: CPT

## 2024-01-24 PROCEDURE — 1036F TOBACCO NON-USER: CPT

## 2024-01-24 PROCEDURE — G9899 SCRN MAM PERF RSLTS DOC: HCPCS

## 2024-01-24 PROCEDURE — G8427 DOCREV CUR MEDS BY ELIG CLIN: HCPCS

## 2024-01-24 RX ORDER — LORAZEPAM 0.5 MG/1
TABLET ORAL
Qty: 2 TABLET | Refills: 0 | Status: SHIPPED | OUTPATIENT
Start: 2024-01-24 | End: 2024-01-24

## 2024-01-24 ASSESSMENT — ENCOUNTER SYMPTOMS
ABDOMINAL PAIN: 0
TROUBLE SWALLOWING: 0
NAUSEA: 0
BACK PAIN: 0
CONSTIPATION: 1
RECTAL PAIN: 0
SHORTNESS OF BREATH: 0
DIARRHEA: 0
BLOOD IN STOOL: 0

## 2024-01-24 NOTE — PROGRESS NOTES
DILATION AND CURETTAGE OF UTERUS  06/08/2022    endometrial polypectomy, cystocele prolapse    KNEE ARTHROSCOPY  2015    THYROIDECTOMY, PARTIAL Right 2021    US GUIDED NEEDLE LOC OF LEFT BREAST Left 1/10/2024    US GUIDED NEEDLE LOC OF LEFT BREAST 1/10/2024 [facility]       Family History   Problem Relation Age of Onset    High Blood Pressure Mother     Stroke Brother        Social History     Socioeconomic History    Marital status: Single     Spouse name: Not on file    Number of children: Not on file    Years of education: Not on file    Highest education level: Not on file   Occupational History    Not on file   Tobacco Use    Smoking status: Former     Current packs/day: 1.00     Types: Cigarettes    Smokeless tobacco: Never    Tobacco comments:     Stopped in 1997   Vaping Use    Vaping Use: Never used   Substance and Sexual Activity    Alcohol use: Not Currently     Comment: socially    Drug use: Never    Sexual activity: Not on file   Other Topics Concern    Not on file   Social History Narrative    Not on file     Social Determinants of Health     Financial Resource Strain: Not on file   Food Insecurity: Not on file   Transportation Needs: Not on file   Physical Activity: Not on file   Stress: Not on file   Social Connections: Not on file   Intimate Partner Violence: Not on file   Housing Stability: Not on file       Allergies   Allergen Reactions    Penicillins      Itchy ears and throat.        Current Outpatient Medications   Medication Sig Dispense Refill    atorvastatin (LIPITOR) 10 MG tablet Take 1 tablet by mouth daily      cetirizine (ZYRTEC) 10 MG tablet Take 1 tablet by mouth daily      chlorthalidone (HYGROTON) 25 MG tablet Take 1 tablet by mouth daily      enalapril (VASOTEC) 10 MG tablet Take 1 tablet by mouth daily      Multiple Vitamin (MULTIVITAMIN) capsule Take 1 capsule by mouth daily      Krill Oil 350 MG CAPS Take 1 capsule by mouth daily      Glucosamine 500 MG CAPS Take 2,000

## 2024-01-24 NOTE — PROGRESS NOTES
Oncology Social Work    Date: 1/24/2024  Time: 11:40 AM  Name: Lizett Avery  MRN: [id number]     Contact Type: Follow-up    Note:   Situation: This staff called Lizett Avery via phone support to introduce myself as her Oncology Social Worker.     Background:  Lizett had her recent appointment at the [facility]. This staff was calling to review her Distress Thermometer.    Assessment: This staff had the opportunity to speak with Lizett on this attempt. I had tried previously, but it went to voicemail. She explained that she was on her way home from her treatment today and so far things are going okay.  - Education regarding the services provided by the SW were explained during our conversation. She was provided with our Resource Guide, which explains the assistance programs in the area, should she need anything.  - Referral to the [place] [facility] Aging was introduced for her to consider at this time as an added resource to her during this journey.    Recommendation: Follow-up will be initiated by Lizett based on need.  provided her with my contact information and will remain available for support.        RAUL Haas, LISSETH, DAMIEN  Oncology Social Worker      Electronically signed by RAUL Haas LSW, DAMIEN on 1/24/2024 at 11:40 AM

## 2024-02-26 DIAGNOSIS — D05.12 DUCTAL CARCINOMA IN SITU (DCIS) OF LEFT BREAST: Primary | ICD-10-CM

## 2024-02-26 RX ORDER — LORAZEPAM 0.5 MG/1
TABLET ORAL
Qty: 4 TABLET | Refills: 0 | Status: SHIPPED | OUTPATIENT
Start: 2024-02-26 | End: 2024-02-26

## 2024-03-20 ENCOUNTER — OFFICE VISIT (OUTPATIENT)
Dept: RADIATION ONCOLOGY | Age: 69
End: 2024-03-20

## 2024-03-20 VITALS
TEMPERATURE: 97.2 F | RESPIRATION RATE: 20 BRPM | OXYGEN SATURATION: 98 % | WEIGHT: 244.71 LBS | SYSTOLIC BLOOD PRESSURE: 163 MMHG | HEART RATE: 83 BPM | BODY MASS INDEX: 34.64 KG/M2 | DIASTOLIC BLOOD PRESSURE: 79 MMHG

## 2024-03-20 DIAGNOSIS — C50.912 MALIGNANT NEOPLASM OF LEFT FEMALE BREAST, UNSPECIFIED ESTROGEN RECEPTOR STATUS, UNSPECIFIED SITE OF BREAST (HCC): Primary | ICD-10-CM

## 2024-03-20 DIAGNOSIS — D05.12 DUCTAL CARCINOMA IN SITU (DCIS) OF LEFT BREAST: Primary | ICD-10-CM

## 2024-03-20 RX ORDER — LORAZEPAM 1 MG/1
1 TABLET ORAL PRN
Qty: 5 TABLET | Refills: 0 | Status: SHIPPED | OUTPATIENT
Start: 2024-03-20 | End: 2024-04-17

## 2024-03-20 NOTE — PROGRESS NOTES
[facility]          Radiation Oncology     [address] [address]  Phone: [phone] - Option 2  Fax:[phone]               Dr. Adalberto Flores MD MS        Dr. Emilee Agosto PhD MD       On Treatment Visit Note (OTV)    Date of Service: 3/20/2024  Patient ID: Lizett Avery   : 1955  MRN: [id number]   Acct Number:        RADIATION ONCOLOGY ATTENDING:  Adalberto Flores MD MS    DIAGNOSIS:   Cancer Staging   Ductal carcinoma in situ (DCIS) of left breast  Staging form: Breast, AJCC 8th Edition  - Pathologic stage from 2024: Stage Unknown (pTis (DCIS), pNX, cM0, ER+, SC+, HER2: Not Assessed) - Signed by Allie Chaves MD on 2024      Treatment Area: Breast     Current Total Dose(cGy): 570  Current Fraction: 1/5  Final/Cumulative Rx. Dose (cGy): 2850    Patient was seen today for weekly visit.     Wt Readings from Last 3 Encounters:   24 111 kg (244 lb 11.4 oz)   24 109 kg (240 lb 4.8 oz)   24 108.8 kg (239 lb 14.4 oz)       BP (!) 163/79 (Site: Left Upper Arm, Position: Sitting)   Pulse 83   Temp 97.2 °F (36.2 °C) (Infrared)   Resp 20   Wt 111 kg (244 lb 11.4 oz)   SpO2 98%   BMI 34.64 kg/m²     Lab Results   Component Value Date    WBC 7.8 10/26/2023    HGB 13.1 10/26/2023    HCT 40.9 10/26/2023     10/26/2023       Comfort Alteration  Fatigue:None, able to perform daily activities    Pain Location: none  Pain Intensity (Current): 0 No Pain  Pain Treatment: N/A  Pain Relief: n/a  Hot Flashes/Flushes: None    Emotional Alteration:   Coping: effective    Nutritional Alteration  Anorexia: none   Nausea: No nausea noted  Vomiting: No vomiting   Dyspepsia/Heartburn: None    Skin Alteration   Skin reaction: No changes noted    Ventilation Alteration  Cough: None  Hemoptysis: None  Dyspnea: Normal  Mucous Quantity/Quality: plans to take ativan prior to

## 2024-03-27 PROCEDURE — G6002 STEREOSCOPIC X-RAY GUIDANCE: HCPCS | Performed by: RADIOLOGY

## 2024-04-03 PROCEDURE — G6002 STEREOSCOPIC X-RAY GUIDANCE: HCPCS | Performed by: RADIOLOGY

## 2024-04-18 ENCOUNTER — OFFICE VISIT (OUTPATIENT)
Dept: SURGERY | Age: 69
End: 2024-04-18
Payer: MEDICARE

## 2024-04-18 ENCOUNTER — OFFICE VISIT (OUTPATIENT)
Dept: RADIATION ONCOLOGY | Age: 69
End: 2024-04-18

## 2024-04-18 ENCOUNTER — CLINICAL DOCUMENTATION (OUTPATIENT)
Dept: RADIATION ONCOLOGY | Age: 69
End: 2024-04-18

## 2024-04-18 VITALS
HEART RATE: 90 BPM | SYSTOLIC BLOOD PRESSURE: 138 MMHG | WEIGHT: 243.83 LBS | BODY MASS INDEX: 34.99 KG/M2 | OXYGEN SATURATION: 95 % | RESPIRATION RATE: 20 BRPM | DIASTOLIC BLOOD PRESSURE: 79 MMHG | TEMPERATURE: 97.2 F

## 2024-04-18 VITALS
TEMPERATURE: 97.4 F | SYSTOLIC BLOOD PRESSURE: 132 MMHG | HEART RATE: 81 BPM | OXYGEN SATURATION: 98 % | BODY MASS INDEX: 34.97 KG/M2 | DIASTOLIC BLOOD PRESSURE: 64 MMHG | WEIGHT: 244.3 LBS | RESPIRATION RATE: 16 BRPM | HEIGHT: 70 IN

## 2024-04-18 DIAGNOSIS — D05.12 DUCTAL CARCINOMA IN SITU (DCIS) OF LEFT BREAST: Primary | ICD-10-CM

## 2024-04-18 PROCEDURE — 1090F PRES/ABSN URINE INCON ASSESS: CPT | Performed by: SURGERY

## 2024-04-18 PROCEDURE — G8417 CALC BMI ABV UP PARAM F/U: HCPCS | Performed by: SURGERY

## 2024-04-18 PROCEDURE — 1123F ACP DISCUSS/DSCN MKR DOCD: CPT | Performed by: SURGERY

## 2024-04-18 PROCEDURE — 99213 OFFICE O/P EST LOW 20 MIN: CPT | Performed by: SURGERY

## 2024-04-18 PROCEDURE — G8400 PT W/DXA NO RESULTS DOC: HCPCS | Performed by: SURGERY

## 2024-04-18 PROCEDURE — 3017F COLORECTAL CA SCREEN DOC REV: CPT | Performed by: SURGERY

## 2024-04-18 PROCEDURE — G8427 DOCREV CUR MEDS BY ELIG CLIN: HCPCS | Performed by: SURGERY

## 2024-04-18 PROCEDURE — 1036F TOBACCO NON-USER: CPT | Performed by: SURGERY

## 2024-04-18 PROCEDURE — G9899 SCRN MAM PERF RSLTS DOC: HCPCS | Performed by: SURGERY

## 2024-04-18 ASSESSMENT — ENCOUNTER SYMPTOMS
BLOOD IN STOOL: 0
COUGH: 0
ABDOMINAL PAIN: 0
WHEEZING: 0
COLOR CHANGE: 0
SHORTNESS OF BREATH: 0
SORE THROAT: 0

## 2024-04-18 NOTE — PROGRESS NOTES
[facility]          Radiation Oncology     [address] [place] [address]  Phone: [phone] - Option 2  Fax:[phone]               Dr. Adalberto Flores MD MS        Dr. Emilee Agosto PhD MD       On Treatment Visit Note (OTV)    Date of Service: 3/20/2024  Patient ID: Lizett Avery   : 1955  MRN: [id number]   Acct Number:        RADIATION ONCOLOGY ATTENDING:  Adalberto Flores MD MS    DIAGNOSIS:   Cancer Staging   Ductal carcinoma in situ (DCIS) of left breast  Staging form: Breast, AJCC 8th Edition  - Pathologic stage from 2024: Stage Unknown (pTis (DCIS), pNX, cM0, ER+, TX+, HER2: Not Assessed) - Signed by Allie Chaves MD on 2024      Treatment Area: Breast     Current Total Dose(cGy): 2850  Current Fraction: 5/5  Final/Cumulative Rx. Dose (cGy): 2850    Patient was seen today for weekly visit.     Wt Readings from Last 3 Encounters:   24 111 kg (244 lb 11.4 oz)   24 109 kg (240 lb 4.8 oz)   24 108.8 kg (239 lb 14.4 oz)       BP (!) 163/79 (Site: Left Upper Arm, Position: Sitting)   Pulse 83   Temp 97.2 °F (36.2 °C) (Infrared)   Resp 20   Wt 111 kg (244 lb 11.4 oz)   SpO2 98%   BMI 34.64 kg/m²     Lab Results   Component Value Date    WBC 7.8 10/26/2023    HGB 13.1 10/26/2023    HCT 40.9 10/26/2023     10/26/2023       Comfort Alteration  Fatigue:None, able to perform daily activities    Pain Location: none  Pain Intensity (Current): 0 No Pain  Pain Treatment: N/A  Pain Relief: n/a  Hot Flashes/Flushes: None    Emotional Alteration:   Coping: effective    Nutritional Alteration  Anorexia: none   Nausea: No nausea noted  Vomiting: No vomiting   Dyspepsia/Heartburn: Mild same with r/t    Skin Alteration   Skin reaction: No changes noted    Ventilation Alteration  Cough: None  Hemoptysis: None  Dyspnea: Normal  Mucous Quantity/Quality:     Additional

## 2024-04-18 NOTE — PROGRESS NOTES
Allie Chaves MD   General Surgery  Follow up Patient Evaluation in Office  Pt Name: Lizett Avery  Date of Birth 1955   Today's Date: 2024  Medical Record Number: [id number]  Referring Provider: No ref. provider found  Primary Care Provider: Bo Servin MD  Chief Complaint:  Chief Complaint   Patient presents with    3 Month Follow-Up     Carcinoma of upper-outer quadrant left breast-estrogen receptor positive-Last seen 24-Follows Dr. Flores-         ASSESSMENT      1. Ductal carcinoma in situ (DCIS) of left breast    2. Completed ration therapy 3/2024    Stage 0 ER+ CO +  PLANS   Assessment & Plan   1. Completed RT  2. Patient to follow up Medical Oncology at [facility].   3. Follow up surgical clinic 6 months    SUBJECTIVE     Lizett is a 68 y.o. year old female who is presenting today in the office for e follow up evaluation of newly diagnosed carcinoma type II of the left breast.  She had abnormal screening mammographyPerformed at an outside facility.  She had a new area of microcalcifications which appears to be no larger than a centimeter in the superior outer left breast.   Stereotactic core biopsies were performed which revealed DCIS which was ER positive and CO strongly positive.  She had 1 prior breast biopsy on the right breast 4 years ago which was benign.  She has had no breast symptoms.    Lizett underwent a partial mastectomy 2024.  She had DCIS nuclear grade 2 cribriform and solid types with some central necrosis.  Was ER +100% and CO +90%.  On lumpectomy specimen she had residual DCIS.  Closest margin was 6 mm.  She completed a course of adjuvant radiation therapy.  2850 cGy in 5 fractions last month.  She is to follow-up with medical oncology to discuss antiestrogen therapy.  Surgical wound is healing well.  She has no range of motion issues.        Menarche age 10.  .  She had her children at age 25 and 31.  She did breast-feed.  She took oral contraceptive  Patient's sister states that patient "hangs out a lot" in her car near [employer] where she used to work. States that patient has been "strange" for many years. Last night patient was friendly and warm, states that at other times patient will walk past her on the street without acknowledging her. States that patient sometimes dresses younger than her age or provocatively in "skimpy clothes" and can seem detached sometimes. States that she was "fine as a youngster" but "had a formal way about her", and became progressively stranger over time. 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 States that she "likes to sit in her car and patrol and look after the kids when they come out of school". States that patient "would never hurt herself or others". Patient is 22 years younger than sister, and 20 years younger than brother. States  is a podiatrist and suffers from "depression and anxiety" but is friendly, and denies any DV, but that he moved in with father in [place] and patient has been by herself for some months now. States that patient is selling her co-op and planning to move into a trailer. 50 F, ,  and living alone in co-op in [place] ( lives in [place]), no dependents, unemployed past 2 years (prior  at [employer]), PPH of psychosis, 1 past admission to [place] [address] in 4/2017 for 5 days, no past SA or SIB, no past aggression, no past outpatient psych tx, no past substance or legal trouble, who presents via EMS activated by officers of [facility] [address] precinct per triage note for reportedly "grabbing breasts" and wandering streets into traffic.      Patient's sister states that patient "hangs out a lot" in her car near [employer] [employer] [facility] [employer] [facility] where she used to work. States that patient has been "strange" for many years. Last night patient was friendly and warm, states that at other times patient will walk past her on the street without acknowledging her. States that patient sometimes dresses younger than her age or provocatively in "skimpy clothes" and can seem detached sometimes. States that she was "fine as a youngster" but "had a formal way about her", and became progressively stranger over time. 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, not interested in having friends or doing anything socially. States that she "likes to sit in her car and patrol and look after the kids when they come out of school". States that patient "would never hurt herself or others". Patient is 22 years younger than sister, and 20 years younger than brother. States  is a podiatrist and suffers from "depression and anxiety" but is friendly, and denies any DV, but that he moved in with father in [place] and patient has been by herself for some months now. States that patient is selling her co-op and planning to move into a trailer. 50 F, ,  and living alone in co-op in [place] ( lives in [place]), no dependents, unemployed past 2 years (prior  at [employer]), PPH of psychosis, 1 past admission to [place] [address] in 4/2017 for 5 days, no past SA or SIB, no past aggression, no past outpatient psych tx, no past substance or legal trouble, who presents via EMS activated by officers of [facility] per triage note for reportedly "grabbing breasts" and wandering streets into traffic. On interview, history mildly limited by some vagueness, and patient notably oddly related but no overt delusions or hallucinations elicited, no paranoia, referentiality, grandiosity elicited. Patient endorses sleeping at least 7 hours through the night, states that mood is "good" and stable, and denies any trauma history. Denies suicidality and homicidality. When asked specifically about events leading to admission, states that she "doesn't know what happened" and that the [facility] officers wanted her to come to the hospital to "check my health". States that she was not grabbing her breasts, was "holding my bag" and denies wandering streets into traffic.    Collateral obtained from [address] precinct (Lieutenant [place]): states that his officers know patient well as she "calls the police often" and "makes up false calls". 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 States he saw her today and she "looked confused" and was "angry" because she didn't want to go to the hospital. Denies any acute safety concerns that he noticed personally when he saw patient at [facility].    Collateral obtained from patient's sister (Alma):  states that patient "hangs out a lot" in her car near [employer] where she used to work. States that patient has been "strange" for many years. Last night patient was friendly and warm, states that at other times patient will walk past her on the street without acknowledging her. States that patient sometimes dresses younger than her age or provocatively in "skimpy clothes" and can seem detached sometimes. States that she was "fine as a youngster" but "had a formal way about her", and became progressively stranger over time. 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 States that she "likes to sit in her car and patrol". States that patient "would never hurt herself or others". Patient is 22 years younger than sister, and 20 years younger than brother. States patient's  is a podiatrist and suffers from "depression and anxiety" but is friendly, and denies any DV, but that he moved in with father in [place] and patient has been by herself for some months now. States that patient is selling her co-op and planning to move into a trailer. Sister saw patient last night when they had dinner together and denies any acute safety concerns regarding patient, states that patient has been doing well and does not know why she is in the ED. Sister agreed to come pick patient up and bring her home personally. ** NOTE: PATIENT HAS SEPARATE CHART IN [place] AND IN AVATAR/CVM UNDER "DELROY WEST" ***    50 F, ,  and living alone in co-op in [place] ( lives in [place]), no dependents, unemployed past 2 years (prior  at [employer]), PPH of psychosis, 1 past admission to [place] [address] in 4/2017 for 5 days, no past SA or SIB, no past aggression, no past outpatient psych tx, no past substance or legal trouble, who presents via EMS activated by officers of [address] per triage note for reportedly "grabbing breasts" and wandering streets into traffic. On interview, history mildly limited by some vagueness, and patient notably oddly related but no overt delusions or hallucinations elicited, no paranoia, referentiality, grandiosity elicited. Patient endorses sleeping at least 7 hours through the night, states that mood is "good" and stable, and denies any trauma history. Denies suicidality and homicidality. When asked specifically about events leading to admission, states that she "doesn't know what happened" and that the [facility] officers wanted her to come to the hospital to "check my health". States that she was not grabbing her breasts, was "holding my bag" and denies wandering streets into traffic.    Collateral obtained from [address] (Lieutenant Lopez): states that his officers know patient well as she "calls the police often" and "makes up false calls". 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 States he saw her today and she "looked confused" and was "angry" because she didn't want to go to the hospital. Denies any acute safety concerns that he noticed personally when he saw patient at [facility].    Collateral obtained from patient's sister (Alma):  states that patient "hangs out a lot" in her car near [employer] where she used to work. States that patient has been "strange" for many years. Last night patient was friendly and warm, states that at other times patient will walk past her on the street without acknowledging her. States that patient sometimes dresses younger than her age or provocatively in "skimpy clothes" and can seem detached sometimes. States that she was "fine as a youngster" but "had a formal way about her", and became progressively stranger over time. 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 States that she "likes to sit in her car and patrol". States that patient "would never hurt herself or others". Patient is 22 years younger than sister, and 20 years younger than brother. States patient's  is a podiatrist and suffers from "depression and anxiety" but is friendly, and denies any DV, but that he moved in with father in [place] and patient has been by herself for some months now. States that patient is selling her co-op and planning to move into a trailer. Sister saw patient last night when they had dinner together and denies any acute safety concerns regarding patient, states that patient has been doing well and does not know why she is in the ED. Sister agreed to come pick patient up and bring her home personally.

## 2024-04-23 ENCOUNTER — CLINICAL DOCUMENTATION (OUTPATIENT)
Dept: RADIATION ONCOLOGY | Age: 69
End: 2024-04-23

## 2024-04-23 NOTE — PROGRESS NOTES
[facility]           Radiation Oncology     [address] [place] [address]  Phone: [phone] - Option 2  Fax:[phone]             END OF TREATMENT SUMMARY    Date of Service: 2024  Patient ID: Lizett Avery   : 1955  MRN: [id number]   Acct Number:          DIAGNOSIS:    Cancer Staging   Ductal carcinoma in situ (DCIS) of left breast  Staging form: Breast, AJCC 8th Edition  - Pathologic stage from 2024: Stage Unknown (pTis (DCIS), pNX, cM0, ER+, CO+, HER2: Not Assessed) - Signed by Allie Chaves MD on 2024      HISTORY OF PRESENT ILLNESS:  Oncology History Overview Note   HISTORY:    24 According to Dr. Chaves note,  \"Lizett is a 68 y.o. year old female who is presenting today in the office for evaluation of newly diagnosed carcinoma type II of the left breast.  She had abnormal screening mammographyPerformed at an outside facility.  She had a new area of microcalcifications which appears to be no larger than a centimeter in the superior outer left breast.   Stereotactic core biopsies were performed which revealed DCIS which was ER positive and CO strongly positive.  She had 1 prior breast biopsy on the right breast 4 years ago which was benign.  She has had no breast symptoms.     Menarche age 10.  .  She had her children at age 25 and 31.  She did breast-feed.  She took oral contraceptive medications for about 8 years.  She has no history of exogenous estrogen use.  Mother history of postmenopausal breast cancer at age 80.\"       Breast cancer, left (HCC)   2021 Biopsy    Right breast biopsies         2023 Imaging    Carin screen B/L         2023 Imaging    Carin left diagnostic         2023 Biopsy    Left breast biopsy         2024 Biopsy    Left breast biopsy    FINAL DIAGNOSIS:   Outside slides [id number]; 2023     Left breast, calcs,

## 2024-05-15 ENCOUNTER — OFFICE VISIT (OUTPATIENT)
Dept: RADIATION ONCOLOGY | Age: 69
End: 2024-05-15

## 2024-05-15 VITALS
WEIGHT: 242.51 LBS | RESPIRATION RATE: 20 BRPM | SYSTOLIC BLOOD PRESSURE: 129 MMHG | BODY MASS INDEX: 34.8 KG/M2 | DIASTOLIC BLOOD PRESSURE: 80 MMHG | HEART RATE: 78 BPM | TEMPERATURE: 96.8 F | OXYGEN SATURATION: 97 %

## 2024-05-15 DIAGNOSIS — D05.12 DUCTAL CARCINOMA IN SITU (DCIS) OF LEFT BREAST: Primary | ICD-10-CM

## 2024-05-15 PROCEDURE — 99024 POSTOP FOLLOW-UP VISIT: CPT

## 2024-05-15 ASSESSMENT — ENCOUNTER SYMPTOMS
ABDOMINAL PAIN: 0
COUGH: 0
TROUBLE SWALLOWING: 0
DIARRHEA: 0
BACK PAIN: 1
RECTAL PAIN: 0
NAUSEA: 0

## 2024-05-15 NOTE — PROGRESS NOTES
progesterone receptor 90%.  See prior report.   The lumpectomy also demonstrates atypical lobular hyperplasia and focal   lobular carcinoma in situ and fibrocystic changes.  The previous biopsy   site is identified.      1/23/2024 Initial Diagnosis    Breast cancer, left (HCC)     3/20/2024 - 4/18/2024 Radiation    Treatment Course Number: 1    Treatment Site (s) Modality Dose (cGy) Fractions Elapsed Days   Left whole breast 3-D Conformal 2850 5 29   Cumulative Dose: 2850 cGy    Radiation therapy delivered under the care of Dr. Adalberto Flores MD MS ([facility])          INTERVAL HISTORY:  Lizett Avery is a 68 y.o. female is presenting today for regularly scheduled follow up regarding the above mentioned oncologic history.    Review of Systems   Constitutional:  Negative for activity change, appetite change, fatigue and unexpected weight change.   HENT:  Negative for ear pain and trouble swallowing.    Respiratory:  Negative for cough and shortness of breath.    Cardiovascular:  Negative for chest pain and leg swelling.   Gastrointestinal:  Negative for abdominal pain, blood in stool, diarrhea, nausea and rectal pain.   Genitourinary:  Negative for dysuria, frequency, hematuria and urgency.   Musculoskeletal:  Positive for back pain (strained while gardening, denies sensory/motor dysfunction of legs or perineum). Negative for arthralgias and myalgias.   Skin:  Negative for rash and wound.   Neurological:  Negative for dizziness, weakness, light-headedness, numbness and headaches.   Psychiatric/Behavioral:  Negative for confusion.        CHAPERONE: declined    PAIN: 0/10    LABORATORY STUDIES:  Onc labs: No results found for: \"PSA\", \"CEA\", \"LDH\", \"AFP\"    MEDICATIONS:   Current Outpatient Medications   Medication Sig Dispense Refill    betamethasone valerate (VALISONE) 0.1 % cream Apply topically 2 times daily. 45 g 1    atorvastatin (LIPITOR) 10 MG tablet Take 1 tablet by mouth daily      cetirizine (ZYRTEC) 10 MG

## 2024-10-23 ENCOUNTER — LAB (OUTPATIENT)
Age: 69
End: 2024-10-23

## 2024-10-23 LAB
ALBUMIN SERPL BCG-MCNC: 4.1 G/DL (ref 3.5–5.1)
ALP SERPL-CCNC: 82 U/L (ref 38–126)
ALT SERPL W/O P-5'-P-CCNC: 34 U/L (ref 11–66)
ANION GAP SERPL CALC-SCNC: 17 MEQ/L (ref 8–16)
AST SERPL-CCNC: 28 U/L (ref 5–40)
BASOPHILS ABSOLUTE: 0.1 THOU/MM3 (ref 0–0.1)
BASOPHILS NFR BLD AUTO: 1 %
BILIRUB SERPL-MCNC: 0.5 MG/DL (ref 0.3–1.2)
BUN SERPL-MCNC: 18 MG/DL (ref 7–22)
CALCIUM SERPL-MCNC: 9.4 MG/DL (ref 8.5–10.5)
CHLORIDE SERPL-SCNC: 102 MEQ/L (ref 98–111)
CO2 SERPL-SCNC: 24 MEQ/L (ref 23–33)
CREAT SERPL-MCNC: 0.7 MG/DL (ref 0.4–1.2)
DEPRECATED RDW RBC AUTO: 41.1 FL (ref 35–45)
EOSINOPHIL NFR BLD AUTO: 4.9 %
EOSINOPHILS ABSOLUTE: 0.4 THOU/MM3 (ref 0–0.4)
ERYTHROCYTE [DISTWIDTH] IN BLOOD BY AUTOMATED COUNT: 13.2 % (ref 11.5–14.5)
GFR SERPL CREATININE-BSD FRML MDRD: > 90 ML/MIN/1.73M2
GLUCOSE SERPL-MCNC: 147 MG/DL (ref 70–108)
HCT VFR BLD AUTO: 41 % (ref 37–47)
HGB BLD-MCNC: 13.2 GM/DL (ref 12–16)
IMM GRANULOCYTES # BLD AUTO: 0.02 THOU/MM3 (ref 0–0.07)
IMM GRANULOCYTES NFR BLD AUTO: 0.3 %
LDH SERPL L TO P-CCNC: 186 U/L (ref 100–190)
LYMPHOCYTES ABSOLUTE: 2.2 THOU/MM3 (ref 1–4.8)
LYMPHOCYTES NFR BLD AUTO: 28.2 %
MCH RBC QN AUTO: 27.4 PG (ref 26–33)
MCHC RBC AUTO-ENTMCNC: 32.2 GM/DL (ref 32.2–35.5)
MCV RBC AUTO: 85.2 FL (ref 81–99)
MONOCYTES ABSOLUTE: 0.5 THOU/MM3 (ref 0.4–1.3)
MONOCYTES NFR BLD AUTO: 5.8 %
NEUTROPHILS ABSOLUTE: 4.7 THOU/MM3 (ref 1.8–7.7)
NEUTROPHILS NFR BLD AUTO: 59.8 %
NRBC BLD AUTO-RTO: 0 /100 WBC
PLATELET # BLD AUTO: 266 THOU/MM3 (ref 130–400)
PMV BLD AUTO: 10.2 FL (ref 9.4–12.4)
POTASSIUM SERPL-SCNC: 3.9 MEQ/L (ref 3.5–5.2)
PROT SERPL-MCNC: 7.5 G/DL (ref 6.1–8)
RBC # BLD AUTO: 4.81 MILL/MM3 (ref 4.2–5.4)
SODIUM SERPL-SCNC: 143 MEQ/L (ref 135–145)
WBC # BLD AUTO: 7.8 THOU/MM3 (ref 4.8–10.8)

## 2024-10-25 PROBLEM — N39.3 STRESS INCONTINENCE: Status: ACTIVE | Noted: 2024-07-10

## 2024-10-25 LAB
CANCER AG15-3 SERPL-ACNC: 9 U/ML (ref 0–31)
CANCER AG27-29 SERPL-ACNC: < 4 U/ML (ref 0–38)

## 2024-10-28 ENCOUNTER — LAB (OUTPATIENT)
Age: 69
End: 2024-10-28

## 2024-10-28 LAB
CREAT UR-MCNC: 106.7 MG/DL
MICROALBUMIN UR-MCNC: < 1.2 MG/DL
MICROALBUMIN/CREAT RATIO PNL UR: 11 MG/G (ref 0–30)
TSH SERPL DL<=0.005 MIU/L-ACNC: 3.49 UIU/ML (ref 0.4–4.2)

## 2024-10-29 LAB
DEPRECATED MEAN GLUCOSE BLD GHB EST-ACNC: 159 MG/DL (ref 70–126)
HBA1C MFR BLD HPLC: 7.3 % (ref 4.4–6.4)

## 2024-10-31 ENCOUNTER — OFFICE VISIT (OUTPATIENT)
Dept: SURGERY | Age: 69
End: 2024-10-31
Payer: MEDICARE

## 2024-10-31 VITALS
WEIGHT: 242 LBS | HEART RATE: 90 BPM | OXYGEN SATURATION: 98 % | SYSTOLIC BLOOD PRESSURE: 140 MMHG | DIASTOLIC BLOOD PRESSURE: 62 MMHG | TEMPERATURE: 97.6 F | BODY MASS INDEX: 34.65 KG/M2 | HEIGHT: 70 IN

## 2024-10-31 DIAGNOSIS — E66.811 OBESITY (BMI 30.0-34.9): ICD-10-CM

## 2024-10-31 DIAGNOSIS — D05.12 DUCTAL CARCINOMA IN SITU (DCIS) OF LEFT BREAST: Primary | ICD-10-CM

## 2024-10-31 PROCEDURE — G8482 FLU IMMUNIZE ORDER/ADMIN: HCPCS | Performed by: SURGERY

## 2024-10-31 PROCEDURE — 1036F TOBACCO NON-USER: CPT | Performed by: SURGERY

## 2024-10-31 PROCEDURE — 1123F ACP DISCUSS/DSCN MKR DOCD: CPT | Performed by: SURGERY

## 2024-10-31 PROCEDURE — 3017F COLORECTAL CA SCREEN DOC REV: CPT | Performed by: SURGERY

## 2024-10-31 PROCEDURE — 99214 OFFICE O/P EST MOD 30 MIN: CPT | Performed by: SURGERY

## 2024-10-31 PROCEDURE — G8400 PT W/DXA NO RESULTS DOC: HCPCS | Performed by: SURGERY

## 2024-10-31 PROCEDURE — G8428 CUR MEDS NOT DOCUMENT: HCPCS | Performed by: SURGERY

## 2024-10-31 PROCEDURE — G8417 CALC BMI ABV UP PARAM F/U: HCPCS | Performed by: SURGERY

## 2024-10-31 PROCEDURE — G9899 SCRN MAM PERF RSLTS DOC: HCPCS | Performed by: SURGERY

## 2024-10-31 PROCEDURE — 1090F PRES/ABSN URINE INCON ASSESS: CPT | Performed by: SURGERY

## 2024-10-31 RX ORDER — ANASTROZOLE 1 MG/1
1 TABLET ORAL DAILY
COMMUNITY
Start: 2024-10-18

## 2024-10-31 ASSESSMENT — ENCOUNTER SYMPTOMS
ABDOMINAL DISTENTION: 0
BLOOD IN STOOL: 0
SORE THROAT: 0
WHEEZING: 0
SHORTNESS OF BREATH: 0
COUGH: 0
ABDOMINAL PAIN: 0
COLOR CHANGE: 0

## 2024-10-31 NOTE — PROGRESS NOTES
Ductal carcinoma in situ  pN Category  Choose a category if lymph nodes received with the specimen;  immunohistochemistry and/or molecular studies are not required  pN not assigned (no nodes submitted or found)  pM Category (required only if confirmed pathologically)   Not applicable - pM cannot be determined from the submitted  specimen(s)    Comment: This patient has a previously diagnosed DCIS, new clear grade  2 (IR 24-2).  The DCIS was positive for estrogen receptor 100%, and  positive for progesterone receptor 90%.  See prior report.  The lumpectomy also demonstrates atypical lobular hyperplasia and focal  lobular carcinoma in situ and fibrocystic changes.  The previous biopsy  site is identified.    B.  Multiple sections of the breast tissue demonstrate fibrocystic  changes with apocrine cystic metaplasia, duct ectasia, usual ductal  hyperplasia.  I do not appreciate any definitive residual neoplasia.  A  focus of osseous metaplasia is noted.  The margin is free of neoplasia.    [address] x 2                                                          <Sign Out Dr. Adams>                                              YARY AGRAWAL M.D., F.C.A.P.      [facility]  Printed on:  1/15/2024  [address] [place] [id number]  Original print date: 01/15/2024       Specimen Collected: 01/10/24 07:12 EST Last Resulted: 01/15/24 15:37 EST

## 2024-11-01 ASSESSMENT — ENCOUNTER SYMPTOMS: NAUSEA: 0

## 2024-11-13 ENCOUNTER — OFFICE VISIT (OUTPATIENT)
Dept: RADIATION ONCOLOGY | Age: 69
End: 2024-11-13

## 2024-11-13 VITALS
HEART RATE: 75 BPM | SYSTOLIC BLOOD PRESSURE: 132 MMHG | OXYGEN SATURATION: 98 % | WEIGHT: 241.8 LBS | BODY MASS INDEX: 34.69 KG/M2 | TEMPERATURE: 97.5 F | DIASTOLIC BLOOD PRESSURE: 78 MMHG | RESPIRATION RATE: 18 BRPM

## 2024-11-13 DIAGNOSIS — D05.12 DUCTAL CARCINOMA IN SITU (DCIS) OF LEFT BREAST: Primary | ICD-10-CM

## 2024-11-13 ASSESSMENT — ENCOUNTER SYMPTOMS
SHORTNESS OF BREATH: 0
ABDOMINAL PAIN: 0
COUGH: 0
RECTAL PAIN: 0
BACK PAIN: 0
NAUSEA: 0
BLOOD IN STOOL: 0
DIARRHEA: 0
TROUBLE SWALLOWING: 0

## 2024-11-13 NOTE — PROGRESS NOTES
declined    PAIN: 0/10    LABORATORY STUDIES:  Onc labs:   Lab Results   Component Value Date/Time     10/23/2024 07:52 AM       MEDICATIONS:   Current Outpatient Medications   Medication Sig Dispense Refill    anastrozole (ARIMIDEX) 1 MG tablet Take 1 tablet by mouth daily      betamethasone valerate (VALISONE) 0.1 % cream Apply topically 2 times daily. 45 g 1    atorvastatin (LIPITOR) 10 MG tablet Take 1 tablet by mouth daily      cetirizine (ZYRTEC) 10 MG tablet Take 1 tablet by mouth daily      chlorthalidone (HYGROTON) 25 MG tablet Take 1 tablet by mouth daily      enalapril (VASOTEC) 10 MG tablet Take 1 tablet by mouth daily      Multiple Vitamin (MULTIVITAMIN) capsule Take 1 capsule by mouth daily      Krill Oil 350 MG CAPS Take 1 capsule by mouth daily      Glucosamine 500 MG CAPS Take 2,000 mg by mouth daily      CALCIUM-VITAMIN D PO Take 2 tablets by mouth daily       No current facility-administered medications for this visit.     All portions of this note that were completed during the initial nursing assessment were discussed and reviewed in detail with the nursing staff member who completed this portion of the note and I agree with the information and assessment as written. A complete review of systems was performed and found to be negative except as presented above.    PHYSICAL EXAMINATION:  VITAL SIGNS: /78 (Site: Left Upper Arm, Position: Sitting)   Pulse 75   Temp 97.5 °F (36.4 °C) (Infrared)   Resp 18   Wt 109.7 kg (241 lb 12.8 oz)   SpO2 98%   BMI 34.69 kg/m²     ECO - Symptomatic but completely ambulatory (Restricted in physically strenuous activity but ambulatory and able to carry out work of a light or sedentary nature. For example, light housework, office work)    Physical Exam  Constitutional:       General: She is not in acute distress.     Appearance: Normal appearance.   HENT:      Head: Normocephalic and atraumatic.   Pulmonary:      Effort: Pulmonary effort is

## 2025-01-21 ENCOUNTER — HOSPITAL ENCOUNTER (OUTPATIENT)
Dept: INFUSION THERAPY | Age: 70
Discharge: HOME | End: 2025-01-21
Payer: MEDICARE

## 2025-01-21 ENCOUNTER — OFFICE VISIT (OUTPATIENT)
Dept: ONCOLOGY | Age: 70
End: 2025-01-21
Payer: MEDICARE

## 2025-01-21 VITALS
WEIGHT: 240 LBS | HEART RATE: 93 BPM | BODY MASS INDEX: 34.36 KG/M2 | TEMPERATURE: 98.7 F | RESPIRATION RATE: 16 BRPM | DIASTOLIC BLOOD PRESSURE: 68 MMHG | OXYGEN SATURATION: 96 % | HEIGHT: 70 IN | SYSTOLIC BLOOD PRESSURE: 150 MMHG

## 2025-01-21 VITALS
OXYGEN SATURATION: 96 % | TEMPERATURE: 98.7 F | RESPIRATION RATE: 16 BRPM | DIASTOLIC BLOOD PRESSURE: 68 MMHG | HEART RATE: 93 BPM | SYSTOLIC BLOOD PRESSURE: 150 MMHG

## 2025-01-21 DIAGNOSIS — D05.12 DUCTAL CARCINOMA IN SITU (DCIS) OF LEFT BREAST: ICD-10-CM

## 2025-01-21 DIAGNOSIS — D05.12 DUCTAL CARCINOMA IN SITU (DCIS) OF LEFT BREAST: Primary | ICD-10-CM

## 2025-01-21 DIAGNOSIS — Z13.820 OSTEOPOROSIS SCREENING: Primary | ICD-10-CM

## 2025-01-21 LAB
ALBUMIN SERPL BCG-MCNC: 4.2 G/DL (ref 3.5–5.1)
ALP SERPL-CCNC: 91 U/L (ref 38–126)
ALT SERPL W/O P-5'-P-CCNC: 42 U/L (ref 11–66)
AST SERPL-CCNC: 35 U/L (ref 5–40)
BASOPHILS # BLD AUTO: 0.1 THOU/MM3 (ref 0–0.1)
BASOPHILS NFR BLD AUTO: 1 % (ref 0–3)
BILIRUB CONJ SERPL-MCNC: < 0.1 MG/DL (ref 0.1–13.8)
BILIRUB SERPL-MCNC: 0.3 MG/DL (ref 0.3–1.2)
BUN BLDP-MCNC: 20 MG/DL (ref 8–26)
CHLORIDE BLD-SCNC: 103 MEQ/L (ref 98–109)
CREAT BLD-MCNC: 0.7 MG/DL (ref 0.5–1.2)
EOSINOPHIL # BLD AUTO: 0.6 THOU/MM3 (ref 0–0.4)
EOSINOPHIL NFR BLD AUTO: 6 % (ref 0–4)
ERYTHROCYTE [DISTWIDTH] IN BLOOD BY AUTOMATED COUNT: 13 % (ref 11.5–14.5)
GFR SERPL CREATININE-BSD FRML MDRD: > 90 ML/MIN/1.73M2
GLUCOSE BLD-MCNC: 120 MG/DL (ref 70–108)
HCT VFR BLD AUTO: 41.3 % (ref 37–47)
HGB BLD-MCNC: 13.7 GM/DL (ref 12–16)
IMM GRANULOCYTES # BLD AUTO: 0.02 THOU/MM3 (ref 0–0.07)
IMM GRANULOCYTES NFR BLD AUTO: 0 %
IONIZED CALCIUM, WHOLE BLOOD: 1.21 MMOL/L (ref 1.12–1.32)
LYMPHOCYTES # BLD AUTO: 2.4 THOU/MM3 (ref 1–4.8)
LYMPHOCYTES NFR BLD AUTO: 24 % (ref 15–47)
MCH RBC QN AUTO: 27.8 PG (ref 26–33)
MCHC RBC AUTO-ENTMCNC: 33.2 GM/DL (ref 32.2–35.5)
MCV RBC AUTO: 84 FL (ref 81–99)
MONOCYTES # BLD AUTO: 0.7 THOU/MM3 (ref 0.4–1.3)
MONOCYTES NFR BLD AUTO: 6 % (ref 0–12)
NEUTROPHILS ABSOLUTE: 6.3 THOU/MM3 (ref 1.8–7.7)
NEUTROPHILS NFR BLD AUTO: 63 % (ref 43–75)
PLATELET # BLD AUTO: 276 THOU/MM3 (ref 130–400)
PMV BLD AUTO: 8.9 FL (ref 9.4–12.4)
POTASSIUM BLD-SCNC: 3.6 MEQ/L (ref 3.5–4.9)
PROT SERPL-MCNC: 7.9 G/DL (ref 6.1–8)
RBC # BLD AUTO: 4.92 MILL/MM3 (ref 4.2–5.4)
SODIUM BLD-SCNC: 138 MEQ/L (ref 138–146)
TOTAL CO2, WHOLE BLOOD: 26 MEQ/L (ref 23–33)
WBC # BLD AUTO: 10.1 THOU/MM3 (ref 4.8–10.8)

## 2025-01-21 PROCEDURE — G9899 SCRN MAM PERF RSLTS DOC: HCPCS | Performed by: STUDENT IN AN ORGANIZED HEALTH CARE EDUCATION/TRAINING PROGRAM

## 2025-01-21 PROCEDURE — 99204 OFFICE O/P NEW MOD 45 MIN: CPT | Performed by: STUDENT IN AN ORGANIZED HEALTH CARE EDUCATION/TRAINING PROGRAM

## 2025-01-21 PROCEDURE — G8427 DOCREV CUR MEDS BY ELIG CLIN: HCPCS | Performed by: STUDENT IN AN ORGANIZED HEALTH CARE EDUCATION/TRAINING PROGRAM

## 2025-01-21 PROCEDURE — 80076 HEPATIC FUNCTION PANEL: CPT

## 2025-01-21 PROCEDURE — G8400 PT W/DXA NO RESULTS DOC: HCPCS | Performed by: STUDENT IN AN ORGANIZED HEALTH CARE EDUCATION/TRAINING PROGRAM

## 2025-01-21 PROCEDURE — 1126F AMNT PAIN NOTED NONE PRSNT: CPT | Performed by: STUDENT IN AN ORGANIZED HEALTH CARE EDUCATION/TRAINING PROGRAM

## 2025-01-21 PROCEDURE — G8417 CALC BMI ABV UP PARAM F/U: HCPCS | Performed by: STUDENT IN AN ORGANIZED HEALTH CARE EDUCATION/TRAINING PROGRAM

## 2025-01-21 PROCEDURE — 99211 OFF/OP EST MAY X REQ PHY/QHP: CPT

## 2025-01-21 PROCEDURE — 80047 BASIC METABLC PNL IONIZED CA: CPT

## 2025-01-21 PROCEDURE — 1090F PRES/ABSN URINE INCON ASSESS: CPT | Performed by: STUDENT IN AN ORGANIZED HEALTH CARE EDUCATION/TRAINING PROGRAM

## 2025-01-21 PROCEDURE — 3017F COLORECTAL CA SCREEN DOC REV: CPT | Performed by: STUDENT IN AN ORGANIZED HEALTH CARE EDUCATION/TRAINING PROGRAM

## 2025-01-21 PROCEDURE — 1159F MED LIST DOCD IN RCRD: CPT | Performed by: STUDENT IN AN ORGANIZED HEALTH CARE EDUCATION/TRAINING PROGRAM

## 2025-01-21 PROCEDURE — 1036F TOBACCO NON-USER: CPT | Performed by: STUDENT IN AN ORGANIZED HEALTH CARE EDUCATION/TRAINING PROGRAM

## 2025-01-21 PROCEDURE — 1123F ACP DISCUSS/DSCN MKR DOCD: CPT | Performed by: STUDENT IN AN ORGANIZED HEALTH CARE EDUCATION/TRAINING PROGRAM

## 2025-01-21 PROCEDURE — 85025 COMPLETE CBC W/AUTO DIFF WBC: CPT

## 2025-01-21 RX ORDER — MELATONIN 10 MG
250 TABLET, SUBLINGUAL SUBLINGUAL DAILY
COMMUNITY

## 2025-01-21 RX ORDER — ANASTROZOLE 1 MG/1
1 TABLET ORAL DAILY
Qty: 90 TABLET | Refills: 0 | Status: SHIPPED | OUTPATIENT
Start: 2025-01-21

## 2025-01-21 NOTE — PROGRESS NOTES
[facility]YS[facility]ANS [place] [facility]  [facility] [place] MEDICAL ONCOLOGY  [address]  [address]  [facility] [address]  Dept: [phone]  Loc: [phone]      Patient Information  Patient Name: Lizett Avery  MRN: [id number]  YOB: 1955   REFERRING PHYSICIAN:    Nikkie Gordon PA-C  [address]  [address]  [place] [address]  Encounter Date: 1/21/2025  Patient Care Team:  Bo Servin MD as PCP - General      Chief Complaint: None    Treatment Diagnosis:  Cancer Staging   Ductal carcinoma in situ (DCIS) of left breast  Staging form: Breast, AJCC 8th Edition  - Pathologic stage from 1/18/2024: Stage Unknown (pTis (DCIS), pNX, cM0, ER+, LA+, HER2: Not Assessed) - Signed by Allie Chaves MD on 1/18/2024    Current Treatment Regimen:  Arimidex 1 mg/day    History of Present Illness:   Lizett Avery is a 69 y.o. referred to me to establish care for the treatment of DCIS, ER/LA positive.    Oncology History:  11/15/2023 had an abnormal screening mammogram of the left breast  -12/19/2023 biopsy: Revealed ductal carcinoma in situ, ER/LA positive  -She underwent a left partial mastectomy followed by radiation therapy.  Completed radiation on 4/18/2024.  -She was started on letrozole therapy and reports an increase in hot flashes during the day and night.  -She was started then on Arimidex 1 mg/day.  -Most recent mammogram was done 11/7/2024 which showed BI-RADS 3  Probably benign, due to finding of architectural distortion and increased density of the left breast, likely post-surgical changes. Follow up ultrasound in 6 months was recommended     Past Medical, Surgical, Family and Social History  Past Medical History:   Diagnosis Date    Allergic rhinitis     Breast cancer (HCC)     Diabetes (HCC)     GERD (gastroesophageal reflux disease)     Hypercholesteremia     Hypertension     Urinary incontinence      Past Surgical History:   Procedure Laterality Date    BREAST BIOPSY Left

## 2025-01-23 ENCOUNTER — SOCIAL WORK (OUTPATIENT)
Dept: ONCOLOGY | Age: 70
End: 2025-01-23

## 2025-01-23 NOTE — PROGRESS NOTES
Oncology Social Work    Date: 1/23/2025  Time: 2:24 PM  Name: Lizett Avery  MRN: [id number]     Contact Type: Distress Tool Follow-up    Note:   Situation: This staff called Lizett Avery (goes by Santos) via phone support to introduce myself as the Oncology Social Worker.     Background: Santos was referred to this staff by the [facility] after a recent appointment here. This staff was calling to review the Distress Thermometer provided during their visit.    Coping Score 0    Areas of Concern Identified    Physical Concern: None selected    Expressive Concern: None selected    Social Concern: None selected    Practical Concern: None selected    Existential Concern: None selected    Assessment: The contact number provided to this staff and Medical Records is the correct number as identified in the voicemail recording. Since the call went to a voicemail, a message was left about the call's purpose.   - Education explaining the 's assistance opportunities was provided on the recorded message left.  - A quick review and willingness to assist in completing the Power of  document was provided since it does not appear in her Medical Record.  - No community referrals were placed at this time because there was no conversation in regards to the treatment plan. Referral services may be reconsidered should there be expressed needs regarding assistance by Her or her family.    Recommendation: Follow-up will be initiated based on need.  provided my contact information and will remain available for support.        RAUL Haas, LISSETH, DAMIEN  Oncology Social Worker      Electronically signed by RAUL Haas LSW, ACHP-SW on 1/23/2025 at 2:24 PM

## 2025-05-05 RX ORDER — ANASTROZOLE 1 MG/1
1 TABLET ORAL DAILY
Qty: 90 TABLET | Refills: 0 | Status: SHIPPED | OUTPATIENT
Start: 2025-05-05

## 2025-05-05 NOTE — TELEPHONE ENCOUNTER
Requested Prescriptions     Pending Prescriptions Disp Refills    anastrozole (ARIMIDEX) 1 MG tablet 90 tablet 0     Sig: Take 1 tablet by mouth daily       Date of last visit: 1/21/2025   Date of next visit: 5/13/2025  Date of last refill: 1/21/25  Pharmacy Name: [facility]

## 2025-05-11 DIAGNOSIS — D05.12 DUCTAL CARCINOMA IN SITU (DCIS) OF LEFT BREAST: Primary | ICD-10-CM

## 2025-05-11 DIAGNOSIS — R97.8 OTHER ABNORMAL TUMOR MARKERS: ICD-10-CM

## 2025-05-13 ENCOUNTER — TELEMEDICINE (OUTPATIENT)
Dept: ONCOLOGY | Age: 70
End: 2025-05-13
Payer: MEDICARE

## 2025-05-13 ENCOUNTER — HOSPITAL ENCOUNTER (OUTPATIENT)
Dept: INFUSION THERAPY | Age: 70
Discharge: HOME | End: 2025-05-13
Payer: MEDICARE

## 2025-05-13 VITALS
TEMPERATURE: 98.1 F | SYSTOLIC BLOOD PRESSURE: 161 MMHG | DIASTOLIC BLOOD PRESSURE: 74 MMHG | HEART RATE: 80 BPM | OXYGEN SATURATION: 96 % | RESPIRATION RATE: 16 BRPM

## 2025-05-13 VITALS
RESPIRATION RATE: 16 BRPM | DIASTOLIC BLOOD PRESSURE: 74 MMHG | HEART RATE: 80 BPM | SYSTOLIC BLOOD PRESSURE: 161 MMHG | TEMPERATURE: 98.1 F | HEIGHT: 70 IN | BODY MASS INDEX: 34.65 KG/M2 | WEIGHT: 242 LBS | OXYGEN SATURATION: 96 %

## 2025-05-13 DIAGNOSIS — D05.12 DUCTAL CARCINOMA IN SITU (DCIS) OF LEFT BREAST: ICD-10-CM

## 2025-05-13 DIAGNOSIS — D05.12 DUCTAL CARCINOMA IN SITU (DCIS) OF LEFT BREAST: Primary | ICD-10-CM

## 2025-05-13 DIAGNOSIS — R97.8 OTHER ABNORMAL TUMOR MARKERS: ICD-10-CM

## 2025-05-13 LAB
ALBUMIN SERPL BCG-MCNC: 3.9 G/DL (ref 3.4–4.9)
ALP SERPL-CCNC: 86 U/L (ref 38–126)
ALT SERPL W/O P-5'-P-CCNC: 41 U/L (ref 10–35)
AST SERPL-CCNC: 35 U/L (ref 10–35)
BASOPHILS # BLD AUTO: 0.1 THOU/MM3 (ref 0–0.1)
BASOPHILS NFR BLD AUTO: 1 % (ref 0–3)
BILIRUB CONJ SERPL-MCNC: 0.2 MG/DL (ref 0–0.2)
BILIRUB SERPL-MCNC: 0.5 MG/DL (ref 0.3–1.2)
BUN BLDP-MCNC: 22 MG/DL (ref 8–26)
CHLORIDE BLD-SCNC: 104 MEQ/L (ref 98–109)
CREAT BLD-MCNC: 0.6 MG/DL (ref 0.5–1.2)
EOSINOPHIL # BLD AUTO: 0.4 THOU/MM3 (ref 0–0.4)
EOSINOPHIL NFR BLD AUTO: 5 % (ref 0–4)
ERYTHROCYTE [DISTWIDTH] IN BLOOD BY AUTOMATED COUNT: 13.1 % (ref 11.5–14.5)
GFR SERPL CREATININE-BSD FRML MDRD: > 90 ML/MIN/1.73M2
GLUCOSE BLD-MCNC: 172 MG/DL (ref 70–108)
HCT VFR BLD AUTO: 38.8 % (ref 37–47)
HGB BLD-MCNC: 12.7 GM/DL (ref 12–16)
IMM GRANULOCYTES # BLD AUTO: 0.01 THOU/MM3 (ref 0–0.07)
IMM GRANULOCYTES NFR BLD AUTO: 0 %
IONIZED CALCIUM, WHOLE BLOOD: 1.17 MMOL/L (ref 1.12–1.32)
LYMPHOCYTES # BLD AUTO: 2.1 THOU/MM3 (ref 1–4.8)
LYMPHOCYTES NFR BLD AUTO: 29 % (ref 15–47)
MCH RBC QN AUTO: 28 PG (ref 26–33)
MCHC RBC AUTO-ENTMCNC: 32.7 GM/DL (ref 32.2–35.5)
MCV RBC AUTO: 86 FL (ref 81–99)
MONOCYTES # BLD AUTO: 0.6 THOU/MM3 (ref 0.4–1.3)
MONOCYTES NFR BLD AUTO: 8 % (ref 0–12)
NEUTROPHILS ABSOLUTE: 4.3 THOU/MM3 (ref 1.8–7.7)
NEUTROPHILS NFR BLD AUTO: 58 % (ref 43–75)
PLATELET # BLD AUTO: 238 THOU/MM3 (ref 130–400)
PMV BLD AUTO: 8.9 FL (ref 9.4–12.4)
POTASSIUM BLD-SCNC: 3.5 MEQ/L (ref 3.5–4.9)
PROT SERPL-MCNC: 7.2 G/DL (ref 6.4–8.3)
RBC # BLD AUTO: 4.53 MILL/MM3 (ref 4.2–5.4)
SODIUM BLD-SCNC: 141 MEQ/L (ref 138–146)
TOTAL CO2, WHOLE BLOOD: 28 MEQ/L (ref 23–33)
WBC # BLD AUTO: 7.4 THOU/MM3 (ref 4.8–10.8)

## 2025-05-13 PROCEDURE — G9899 SCRN MAM PERF RSLTS DOC: HCPCS | Performed by: STUDENT IN AN ORGANIZED HEALTH CARE EDUCATION/TRAINING PROGRAM

## 2025-05-13 PROCEDURE — 99214 OFFICE O/P EST MOD 30 MIN: CPT | Performed by: STUDENT IN AN ORGANIZED HEALTH CARE EDUCATION/TRAINING PROGRAM

## 2025-05-13 PROCEDURE — G8400 PT W/DXA NO RESULTS DOC: HCPCS | Performed by: STUDENT IN AN ORGANIZED HEALTH CARE EDUCATION/TRAINING PROGRAM

## 2025-05-13 PROCEDURE — 1123F ACP DISCUSS/DSCN MKR DOCD: CPT | Performed by: STUDENT IN AN ORGANIZED HEALTH CARE EDUCATION/TRAINING PROGRAM

## 2025-05-13 PROCEDURE — 1036F TOBACCO NON-USER: CPT | Performed by: STUDENT IN AN ORGANIZED HEALTH CARE EDUCATION/TRAINING PROGRAM

## 2025-05-13 PROCEDURE — 80076 HEPATIC FUNCTION PANEL: CPT

## 2025-05-13 PROCEDURE — 80047 BASIC METABLC PNL IONIZED CA: CPT

## 2025-05-13 PROCEDURE — 1159F MED LIST DOCD IN RCRD: CPT | Performed by: STUDENT IN AN ORGANIZED HEALTH CARE EDUCATION/TRAINING PROGRAM

## 2025-05-13 PROCEDURE — 1090F PRES/ABSN URINE INCON ASSESS: CPT | Performed by: STUDENT IN AN ORGANIZED HEALTH CARE EDUCATION/TRAINING PROGRAM

## 2025-05-13 PROCEDURE — G8417 CALC BMI ABV UP PARAM F/U: HCPCS | Performed by: STUDENT IN AN ORGANIZED HEALTH CARE EDUCATION/TRAINING PROGRAM

## 2025-05-13 PROCEDURE — 1126F AMNT PAIN NOTED NONE PRSNT: CPT | Performed by: STUDENT IN AN ORGANIZED HEALTH CARE EDUCATION/TRAINING PROGRAM

## 2025-05-13 PROCEDURE — 86300 IMMUNOASSAY TUMOR CA 15-3: CPT

## 2025-05-13 PROCEDURE — 99211 OFF/OP EST MAY X REQ PHY/QHP: CPT

## 2025-05-13 PROCEDURE — G8427 DOCREV CUR MEDS BY ELIG CLIN: HCPCS | Performed by: STUDENT IN AN ORGANIZED HEALTH CARE EDUCATION/TRAINING PROGRAM

## 2025-05-13 PROCEDURE — 3017F COLORECTAL CA SCREEN DOC REV: CPT | Performed by: STUDENT IN AN ORGANIZED HEALTH CARE EDUCATION/TRAINING PROGRAM

## 2025-05-13 PROCEDURE — 85025 COMPLETE CBC W/AUTO DIFF WBC: CPT

## 2025-05-13 RX ORDER — MULTIVIT WITH MINERALS/LUTEIN
500 TABLET ORAL DAILY
COMMUNITY

## 2025-05-16 LAB — CANCER AG27-29 SERPL-ACNC: 14 U/ML (ref 0–38)

## 2025-05-19 ENCOUNTER — OFFICE VISIT (OUTPATIENT)
Dept: RADIATION ONCOLOGY | Age: 70
End: 2025-05-19

## 2025-05-19 ENCOUNTER — LAB (OUTPATIENT)
Age: 70
End: 2025-05-19

## 2025-05-19 VITALS
DIASTOLIC BLOOD PRESSURE: 78 MMHG | SYSTOLIC BLOOD PRESSURE: 124 MMHG | WEIGHT: 240 LBS | BODY MASS INDEX: 34.44 KG/M2 | HEART RATE: 74 BPM | RESPIRATION RATE: 20 BRPM | TEMPERATURE: 97.5 F | OXYGEN SATURATION: 98 %

## 2025-05-19 DIAGNOSIS — D05.12 DUCTAL CARCINOMA IN SITU (DCIS) OF LEFT BREAST: Primary | ICD-10-CM

## 2025-05-19 LAB
ALBUMIN SERPL BCG-MCNC: 4 G/DL (ref 3.4–4.9)
ALP SERPL-CCNC: 88 U/L (ref 38–126)
ALT SERPL W/O P-5'-P-CCNC: 36 U/L (ref 10–35)
ANION GAP SERPL CALC-SCNC: 12 MEQ/L (ref 8–16)
AST SERPL-CCNC: 33 U/L (ref 10–35)
BILIRUB SERPL-MCNC: 0.6 MG/DL (ref 0.3–1.2)
BUN SERPL-MCNC: 23 MG/DL (ref 8–23)
CALCIUM SERPL-MCNC: 9.7 MG/DL (ref 8.8–10.2)
CHLORIDE SERPL-SCNC: 103 MEQ/L (ref 98–111)
CHOLEST SERPL-MCNC: 125 MG/DL (ref 100–199)
CO2 SERPL-SCNC: 25 MEQ/L (ref 22–29)
CREAT SERPL-MCNC: 0.9 MG/DL (ref 0.5–0.9)
DEPRECATED MEAN GLUCOSE BLD GHB EST-ACNC: 165 MG/DL (ref 70–126)
GFR SERPL CREATININE-BSD FRML MDRD: 68 ML/MIN/1.73M2
GLUCOSE SERPL-MCNC: 162 MG/DL (ref 74–109)
HBA1C MFR BLD HPLC: 7.5 % (ref 4–6)
HDLC SERPL-MCNC: 36 MG/DL
LDLC SERPL CALC-MCNC: 67 MG/DL
POTASSIUM SERPL-SCNC: 4.1 MEQ/L (ref 3.5–5.2)
PROT SERPL-MCNC: 7.1 G/DL (ref 6.4–8.3)
SODIUM SERPL-SCNC: 140 MEQ/L (ref 135–145)
TRIGL SERPL-MCNC: 110 MG/DL (ref 0–199)

## 2025-05-19 ASSESSMENT — ENCOUNTER SYMPTOMS
ABDOMINAL PAIN: 0
TROUBLE SWALLOWING: 0
BLOOD IN STOOL: 0
NAUSEA: 0
BACK PAIN: 0
RECTAL PAIN: 0
COUGH: 0
DIARRHEA: 0
SHORTNESS OF BREATH: 1

## 2025-05-19 NOTE — PROGRESS NOTES
[facility]           Radiation Oncology     [address] [address]  Phone: [phone] - Option 2  Fax:[phone]             FOLLOW UP NOTE    Date of Service: 2025  Patient ID: Lizett Avery   : 1955  MRN: [id number]   Acct Number:        DATE OF SERVICE: 2025   LOCATION: [facility] RAD ONC [place]   PROVIDER: Sade Salcido MD    FOLLOW UP PHYSICIANS: Dr. Lilian Yoo ([facility])    ASSESSMENT AND PLAN:   Cancer Staging   Ductal carcinoma in situ (DCIS) of left breast  Staging form: Breast, AJCC 8th Edition  - Pathologic stage from 2024: Stage Unknown (pTis (DCIS), pNX, cM0, ER+, IN+, HER2: Not Assessed) - Signed by Allie Chaves MD on 2024    - Lizett Avery is a 69 y.o. female who presents today for regularly-scheduled follow-up for her DCIS of the left breast. She underwent adjuvant radiation to the left whole breast, which was completed on 2024  - She appears to be doing well. She denies lumps, nipple discharge or inversion, skin changes, swelling, or pain in the breasts, axillae, or arms  - No concerning findings for recurrence on clinical breast exam today  - Recent mammogram and ultrasound at [facility] completed on 25 resulted as: BI-RADS 3: Probably benign, due to finding of architectural distortion and increased density of the left breast, likely post-surgical changes. Follow up ultrasound in 6 months was recommended and will be scheduled by radiology  - The patient continues on anti-estrogen therapy, managed by medical oncology  - Continue to follow with all other medical providers. Last FU with Dr. Yoo by telemedicine on 2025.. To continue follow up with her dermatologist, Dr. Almaguer.  - Discussed breast exams, symptom awareness, healthy lifestyle habits  - I advised the patient to call with any new/worsening symptoms that may arise before her

## 2025-08-04 RX ORDER — ANASTROZOLE 1 MG/1
1 TABLET ORAL DAILY
Qty: 90 TABLET | Refills: 0 | Status: SHIPPED | OUTPATIENT
Start: 2025-08-04

## 2025-08-15 ENCOUNTER — LAB (OUTPATIENT)
Age: 70
End: 2025-08-15

## 2025-08-15 LAB
ALBUMIN SERPL BCG-MCNC: 4.1 G/DL (ref 3.4–4.9)
ALP SERPL-CCNC: 85 U/L (ref 38–126)
ALT SERPL W/O P-5'-P-CCNC: 27 U/L (ref 10–35)
ANION GAP SERPL CALC-SCNC: 15 MEQ/L (ref 8–16)
AST SERPL-CCNC: 31 U/L (ref 10–35)
BILIRUB SERPL-MCNC: 0.7 MG/DL (ref 0.3–1.2)
BUN SERPL-MCNC: 23 MG/DL (ref 8–23)
CALCIUM SERPL-MCNC: 10 MG/DL (ref 8.5–10.5)
CHLORIDE SERPL-SCNC: 102 MEQ/L (ref 98–111)
CHOLEST SERPL-MCNC: 136 MG/DL (ref 100–199)
CO2 SERPL-SCNC: 24 MEQ/L (ref 22–29)
CREAT SERPL-MCNC: 0.8 MG/DL (ref 0.5–0.9)
DEPRECATED MEAN GLUCOSE BLD GHB EST-ACNC: 159 MG/DL (ref 70–126)
GFR SERPL CREATININE-BSD FRML MDRD: 78 ML/MIN/1.73M2
GLUCOSE SERPL-MCNC: 148 MG/DL (ref 74–109)
HBA1C MFR BLD HPLC: 7.3 % (ref 4–6)
HDLC SERPL-MCNC: 38 MG/DL
LDLC SERPL CALC-MCNC: 68 MG/DL
POTASSIUM SERPL-SCNC: 3.9 MEQ/L (ref 3.5–5.2)
PROT SERPL-MCNC: 7.4 G/DL (ref 6.4–8.3)
SODIUM SERPL-SCNC: 141 MEQ/L (ref 135–145)
TRIGL SERPL-MCNC: 148 MG/DL (ref 0–199)

## (undated) DEVICE — SUTURE MCRYL SZ 4-0 L27IN ABSRB UD L19MM PS-2 1/2 CIR PRIM Y426H

## (undated) DEVICE — KENDALL SCD EXPRESS SLEEVES, KNEE LENGTH, MEDIUM: Brand: KENDALL SCD

## (undated) DEVICE — SKIN AFFIX SURG ADHESIVE 72/CS 0.55ML: Brand: MEDLINE

## (undated) DEVICE — SUTURE VCRL SZ 2-0 L27IN ABSRB UD L26MM SH 1/2 CIR J417H

## (undated) DEVICE — GLOVE SURG SZ 7 L12IN FNGR THK94MIL TRNSLUC YEL LTX HYDRGEL

## (undated) DEVICE — Z INACTIVE SYRINGE BLB IRR

## (undated) DEVICE — SHEET, T, LAPAROTOMY, STERILE: Brand: MEDLINE

## (undated) DEVICE — Z INACTIVE NO ACTIVE SUPPLIER APPLICATOR MEDICATED 26 CC TINT HI-LITE ORNG STRL CHLORAPREP

## (undated) DEVICE — TUBING, SUCTION, 1/4" X 12', STRAIGHT: Brand: MEDLINE

## (undated) DEVICE — SUTURE VCRL SZ 3-0 L27IN ABSRB UD L26MM SH 1/2 CIR J416H

## (undated) DEVICE — YANKAUER,BULB TIP,W/O VENT,RIGID,STERILE: Brand: MEDLINE

## (undated) DEVICE — PACK PROCEDURE SURG PLAS SC MIN SRHP LF